# Patient Record
Sex: FEMALE | Race: WHITE | URBAN - METROPOLITAN AREA
[De-identification: names, ages, dates, MRNs, and addresses within clinical notes are randomized per-mention and may not be internally consistent; named-entity substitution may affect disease eponyms.]

---

## 2022-01-07 ENCOUNTER — LAB VISIT (OUTPATIENT)
Dept: PRIMARY CARE CLINIC | Facility: CLINIC | Age: 57
End: 2022-01-07
Payer: COMMERCIAL

## 2022-01-07 DIAGNOSIS — Z20.822 CONTACT WITH AND (SUSPECTED) EXPOSURE TO COVID-19: ICD-10-CM

## 2022-01-07 LAB
CTP QC/QA: YES
SARS-COV-2 AG RESP QL IA.RAPID: POSITIVE

## 2022-01-07 PROCEDURE — 87811 SARS-COV-2 COVID19 W/OPTIC: CPT

## 2024-08-27 ENCOUNTER — HOSPITAL ENCOUNTER (INPATIENT)
Facility: HOSPITAL | Age: 59
LOS: 3 days | Discharge: HOME | DRG: 637 | End: 2024-08-30
Attending: EMERGENCY MEDICINE | Admitting: INTERNAL MEDICINE
Payer: COMMERCIAL

## 2024-08-27 DIAGNOSIS — I26.99 OTHER ACUTE PULMONARY EMBOLISM WITHOUT ACUTE COR PULMONALE: ICD-10-CM

## 2024-08-27 DIAGNOSIS — E83.42 HYPOMAGNESEMIA: ICD-10-CM

## 2024-08-27 DIAGNOSIS — J96.01 ACUTE HYPOXIC RESPIRATORY FAILURE: ICD-10-CM

## 2024-08-27 DIAGNOSIS — D64.9 NORMOCYTIC ANEMIA: ICD-10-CM

## 2024-08-27 DIAGNOSIS — I82.432 ACUTE DEEP VEIN THROMBOSIS (DVT) OF POPLITEAL VEIN OF LEFT LOWER EXTREMITY: ICD-10-CM

## 2024-08-27 DIAGNOSIS — R07.9 CHEST PAIN: ICD-10-CM

## 2024-08-27 DIAGNOSIS — I26.99 ACUTE PULMONARY EMBOLISM, UNSPECIFIED PULMONARY EMBOLISM TYPE, UNSPECIFIED WHETHER ACUTE COR PULMONALE PRESENT: ICD-10-CM

## 2024-08-27 DIAGNOSIS — E11.65 TYPE 2 DIABETES MELLITUS WITH HYPERGLYCEMIA, WITHOUT LONG-TERM CURRENT USE OF INSULIN: ICD-10-CM

## 2024-08-27 DIAGNOSIS — R06.02 SHORTNESS OF BREATH: ICD-10-CM

## 2024-08-27 DIAGNOSIS — E11.10 DIABETIC KETOACIDOSIS WITHOUT COMA ASSOCIATED WITH TYPE 2 DIABETES MELLITUS: Primary | ICD-10-CM

## 2024-08-27 DIAGNOSIS — R79.89 ELEVATED TROPONIN: ICD-10-CM

## 2024-08-27 PROBLEM — E87.29 HIGH ANION GAP METABOLIC ACIDOSIS: Status: ACTIVE | Noted: 2024-08-27

## 2024-08-27 LAB
ALBUMIN SERPL BCP-MCNC: 3.9 G/DL (ref 3.5–5.2)
ALP SERPL-CCNC: 204 U/L (ref 55–135)
ALT SERPL W/O P-5'-P-CCNC: 19 U/L (ref 10–44)
ANION GAP SERPL CALC-SCNC: 11 MMOL/L (ref 8–16)
ANION GAP SERPL CALC-SCNC: 17 MMOL/L (ref 8–16)
ANION GAP SERPL CALC-SCNC: 18 MMOL/L (ref 8–16)
APTT PPP: 28.7 SEC (ref 21–32)
AST SERPL-CCNC: 22 U/L (ref 10–40)
B-OH-BUTYR BLD STRIP-SCNC: 2.5 MMOL/L (ref 0–0.5)
BACTERIA #/AREA URNS HPF: NORMAL /HPF
BASOPHILS # BLD AUTO: 0.05 K/UL (ref 0–0.2)
BASOPHILS NFR BLD: 0.4 % (ref 0–1.9)
BILIRUB SERPL-MCNC: 0.6 MG/DL (ref 0.1–1)
BILIRUB UR QL STRIP: NEGATIVE
BNP SERPL-MCNC: 29 PG/ML (ref 0–99)
BUN SERPL-MCNC: 14 MG/DL (ref 6–20)
BUN SERPL-MCNC: 16 MG/DL (ref 6–20)
BUN SERPL-MCNC: 20 MG/DL (ref 6–20)
CALCIUM SERPL-MCNC: 8.3 MG/DL (ref 8.7–10.5)
CALCIUM SERPL-MCNC: 8.7 MG/DL (ref 8.7–10.5)
CALCIUM SERPL-MCNC: 9.7 MG/DL (ref 8.7–10.5)
CHLORIDE SERPL-SCNC: 100 MMOL/L (ref 95–110)
CHLORIDE SERPL-SCNC: 101 MMOL/L (ref 95–110)
CHLORIDE SERPL-SCNC: 104 MMOL/L (ref 95–110)
CLARITY UR: CLEAR
CO2 SERPL-SCNC: 15 MMOL/L (ref 23–29)
CO2 SERPL-SCNC: 17 MMOL/L (ref 23–29)
CO2 SERPL-SCNC: 19 MMOL/L (ref 23–29)
COLOR UR: COLORLESS
CREAT SERPL-MCNC: 0.8 MG/DL (ref 0.5–1.4)
CREAT SERPL-MCNC: 1 MG/DL (ref 0.5–1.4)
CREAT SERPL-MCNC: 1.3 MG/DL (ref 0.5–1.4)
DIFFERENTIAL METHOD BLD: ABNORMAL
EOSINOPHIL # BLD AUTO: 0.1 K/UL (ref 0–0.5)
EOSINOPHIL NFR BLD: 0.4 % (ref 0–8)
ERYTHROCYTE [DISTWIDTH] IN BLOOD BY AUTOMATED COUNT: 13.3 % (ref 11.5–14.5)
EST. GFR  (NO RACE VARIABLE): 48 ML/MIN/1.73 M^2
EST. GFR  (NO RACE VARIABLE): >60 ML/MIN/1.73 M^2
EST. GFR  (NO RACE VARIABLE): >60 ML/MIN/1.73 M^2
ESTIMATED AVG GLUCOSE: ABNORMAL MG/DL (ref 68–131)
GLUCOSE SERPL-MCNC: 318 MG/DL (ref 70–110)
GLUCOSE SERPL-MCNC: 468 MG/DL (ref 70–110)
GLUCOSE SERPL-MCNC: 586 MG/DL (ref 70–110)
GLUCOSE UR QL STRIP: ABNORMAL
HBA1C MFR BLD: >14 % (ref 4–5.6)
HCT VFR BLD AUTO: 40.7 % (ref 37–48.5)
HGB BLD-MCNC: 14.5 G/DL (ref 12–16)
HGB UR QL STRIP: NEGATIVE
HYALINE CASTS #/AREA URNS LPF: 0 /LPF
IMM GRANULOCYTES # BLD AUTO: 0.08 K/UL (ref 0–0.04)
IMM GRANULOCYTES NFR BLD AUTO: 0.6 % (ref 0–0.5)
INFLUENZA A, MOLECULAR: NEGATIVE
INFLUENZA B, MOLECULAR: NEGATIVE
INR PPP: 1.1 (ref 0.8–1.2)
KETONES UR QL STRIP: ABNORMAL
LACTATE SERPL-SCNC: 2 MMOL/L (ref 0.5–2.2)
LEUKOCYTE ESTERASE UR QL STRIP: NEGATIVE
LYMPHOCYTES # BLD AUTO: 1.3 K/UL (ref 1–4.8)
LYMPHOCYTES NFR BLD: 9.3 % (ref 18–48)
MAGNESIUM SERPL-MCNC: 1.5 MG/DL (ref 1.6–2.6)
MCH RBC QN AUTO: 30.1 PG (ref 27–31)
MCHC RBC AUTO-ENTMCNC: 35.6 G/DL (ref 32–36)
MCV RBC AUTO: 85 FL (ref 82–98)
MICROSCOPIC COMMENT: NORMAL
MONOCYTES # BLD AUTO: 0.6 K/UL (ref 0.3–1)
MONOCYTES NFR BLD: 4.2 % (ref 4–15)
NEUTROPHILS # BLD AUTO: 11.7 K/UL (ref 1.8–7.7)
NEUTROPHILS NFR BLD: 85.1 % (ref 38–73)
NITRITE UR QL STRIP: NEGATIVE
NRBC BLD-RTO: 0 /100 WBC
PCO2 BLDA: 32.5 MMHG (ref 35–45)
PH SMN: 7.37 [PH] (ref 7.35–7.45)
PH UR STRIP: 5 [PH] (ref 5–8)
PHOSPHATE SERPL-MCNC: 3.3 MG/DL (ref 2.7–4.5)
PLATELET # BLD AUTO: 338 K/UL (ref 150–450)
PLATELET BLD QL SMEAR: ABNORMAL
PMV BLD AUTO: 11 FL (ref 9.2–12.9)
PO2 BLDA: 44.3 MMHG (ref 40–60)
POC BASE DEFICIT: -5.5 MMOL/L (ref -2–2)
POC HCO3: 18.8 MMOL/L (ref 24–28)
POC PERFORMED BY: ABNORMAL
POC SATURATED O2: 77.8 % (ref 95–100)
POCT GLUCOSE: 321 MG/DL (ref 70–110)
POCT GLUCOSE: 329 MG/DL (ref 70–110)
POCT GLUCOSE: 335 MG/DL (ref 70–110)
POCT GLUCOSE: 361 MG/DL (ref 70–110)
POCT GLUCOSE: 429 MG/DL (ref 70–110)
POCT GLUCOSE: >500 MG/DL (ref 70–110)
POTASSIUM SERPL-SCNC: 3.7 MMOL/L (ref 3.5–5.1)
POTASSIUM SERPL-SCNC: 4.2 MMOL/L (ref 3.5–5.1)
POTASSIUM SERPL-SCNC: 5.1 MMOL/L (ref 3.5–5.1)
PROT SERPL-MCNC: 7.9 G/DL (ref 6–8.4)
PROT UR QL STRIP: ABNORMAL
PROTHROMBIN TIME: 11.6 SEC (ref 9–12.5)
RBC # BLD AUTO: 4.81 M/UL (ref 4–5.4)
RBC #/AREA URNS HPF: 1 /HPF (ref 0–4)
SARS-COV-2 RDRP RESP QL NAA+PROBE: NEGATIVE
SODIUM SERPL-SCNC: 133 MMOL/L (ref 136–145)
SODIUM SERPL-SCNC: 134 MMOL/L (ref 136–145)
SODIUM SERPL-SCNC: 135 MMOL/L (ref 136–145)
SP GR UR STRIP: >1.03 (ref 1–1.03)
SPECIMEN SOURCE: ABNORMAL
SPECIMEN SOURCE: NORMAL
SQUAMOUS #/AREA URNS HPF: 0 /HPF
TROPONIN I SERPL DL<=0.01 NG/ML-MCNC: 0.09 NG/ML (ref 0–0.03)
TROPONIN I SERPL DL<=0.01 NG/ML-MCNC: 0.1 NG/ML (ref 0–0.03)
TROPONIN I SERPL DL<=0.01 NG/ML-MCNC: 0.1 NG/ML (ref 0–0.03)
URN SPEC COLLECT METH UR: ABNORMAL
UROBILINOGEN UR STRIP-ACNC: NEGATIVE EU/DL
WBC # BLD AUTO: 13.69 K/UL (ref 3.9–12.7)
WBC #/AREA URNS HPF: 1 /HPF (ref 0–5)
YEAST URNS QL MICRO: NORMAL

## 2024-08-27 PROCEDURE — 82010 KETONE BODYS QUAN: CPT

## 2024-08-27 PROCEDURE — 83036 HEMOGLOBIN GLYCOSYLATED A1C: CPT | Performed by: STUDENT IN AN ORGANIZED HEALTH CARE EDUCATION/TRAINING PROGRAM

## 2024-08-27 PROCEDURE — 84100 ASSAY OF PHOSPHORUS: CPT | Performed by: STUDENT IN AN ORGANIZED HEALTH CARE EDUCATION/TRAINING PROGRAM

## 2024-08-27 PROCEDURE — 84484 ASSAY OF TROPONIN QUANT: CPT | Mod: 91 | Performed by: STUDENT IN AN ORGANIZED HEALTH CARE EDUCATION/TRAINING PROGRAM

## 2024-08-27 PROCEDURE — 85730 THROMBOPLASTIN TIME PARTIAL: CPT | Performed by: STUDENT IN AN ORGANIZED HEALTH CARE EDUCATION/TRAINING PROGRAM

## 2024-08-27 PROCEDURE — 96361 HYDRATE IV INFUSION ADD-ON: CPT

## 2024-08-27 PROCEDURE — 63600175 PHARM REV CODE 636 W HCPCS: Performed by: STUDENT IN AN ORGANIZED HEALTH CARE EDUCATION/TRAINING PROGRAM

## 2024-08-27 PROCEDURE — 81000 URINALYSIS NONAUTO W/SCOPE: CPT

## 2024-08-27 PROCEDURE — 25500020 PHARM REV CODE 255: Performed by: EMERGENCY MEDICINE

## 2024-08-27 PROCEDURE — 63600175 PHARM REV CODE 636 W HCPCS: Performed by: EMERGENCY MEDICINE

## 2024-08-27 PROCEDURE — 99900035 HC TECH TIME PER 15 MIN (STAT)

## 2024-08-27 PROCEDURE — 80048 BASIC METABOLIC PNL TOTAL CA: CPT | Mod: 91,XB | Performed by: STUDENT IN AN ORGANIZED HEALTH CARE EDUCATION/TRAINING PROGRAM

## 2024-08-27 PROCEDURE — 83880 ASSAY OF NATRIURETIC PEPTIDE: CPT

## 2024-08-27 PROCEDURE — 87502 INFLUENZA DNA AMP PROBE: CPT

## 2024-08-27 PROCEDURE — 84484 ASSAY OF TROPONIN QUANT: CPT

## 2024-08-27 PROCEDURE — 80053 COMPREHEN METABOLIC PANEL: CPT

## 2024-08-27 PROCEDURE — 82803 BLOOD GASES ANY COMBINATION: CPT

## 2024-08-27 PROCEDURE — U0002 COVID-19 LAB TEST NON-CDC: HCPCS

## 2024-08-27 PROCEDURE — 83735 ASSAY OF MAGNESIUM: CPT | Performed by: STUDENT IN AN ORGANIZED HEALTH CARE EDUCATION/TRAINING PROGRAM

## 2024-08-27 PROCEDURE — 85025 COMPLETE CBC W/AUTO DIFF WBC: CPT

## 2024-08-27 PROCEDURE — 11000001 HC ACUTE MED/SURG PRIVATE ROOM

## 2024-08-27 PROCEDURE — 63600175 PHARM REV CODE 636 W HCPCS

## 2024-08-27 PROCEDURE — 85610 PROTHROMBIN TIME: CPT | Performed by: STUDENT IN AN ORGANIZED HEALTH CARE EDUCATION/TRAINING PROGRAM

## 2024-08-27 PROCEDURE — 99291 CRITICAL CARE FIRST HOUR: CPT

## 2024-08-27 PROCEDURE — 25000003 PHARM REV CODE 250

## 2024-08-27 PROCEDURE — 25000003 PHARM REV CODE 250: Performed by: EMERGENCY MEDICINE

## 2024-08-27 PROCEDURE — 96374 THER/PROPH/DIAG INJ IV PUSH: CPT

## 2024-08-27 PROCEDURE — 83605 ASSAY OF LACTIC ACID: CPT | Performed by: EMERGENCY MEDICINE

## 2024-08-27 RX ORDER — DEXTROSE MONOHYDRATE 100 MG/ML
INJECTION, SOLUTION INTRAVENOUS
Status: DISCONTINUED | OUTPATIENT
Start: 2024-08-27 | End: 2024-08-27

## 2024-08-27 RX ORDER — ALPRAZOLAM 0.5 MG/1
0.5 TABLET ORAL DAILY PRN
COMMUNITY

## 2024-08-27 RX ORDER — IBUPROFEN 200 MG
24 TABLET ORAL
Status: DISCONTINUED | OUTPATIENT
Start: 2024-08-27 | End: 2024-08-30 | Stop reason: HOSPADM

## 2024-08-27 RX ORDER — DEXTROSE MONOHYDRATE, SODIUM CHLORIDE, AND POTASSIUM CHLORIDE 50; 1.49; 4.5 G/1000ML; G/1000ML; G/1000ML
INJECTION, SOLUTION INTRAVENOUS CONTINUOUS PRN
Status: CANCELLED | OUTPATIENT
Start: 2024-08-27

## 2024-08-27 RX ORDER — IBUPROFEN 200 MG
200 TABLET ORAL EVERY 6 HOURS PRN
Status: ON HOLD | COMMUNITY
End: 2024-08-30 | Stop reason: HOSPADM

## 2024-08-27 RX ORDER — IBUPROFEN 200 MG
16 TABLET ORAL
Status: DISCONTINUED | OUTPATIENT
Start: 2024-08-27 | End: 2024-08-30 | Stop reason: HOSPADM

## 2024-08-27 RX ORDER — INSULIN ASPART 100 [IU]/ML
0-10 INJECTION, SOLUTION INTRAVENOUS; SUBCUTANEOUS
Status: DISCONTINUED | OUTPATIENT
Start: 2024-08-27 | End: 2024-08-30 | Stop reason: HOSPADM

## 2024-08-27 RX ORDER — INSULIN GLARGINE 100 [IU]/ML
20 INJECTION, SOLUTION SUBCUTANEOUS NIGHTLY
Status: ON HOLD | COMMUNITY
End: 2024-08-30 | Stop reason: HOSPADM

## 2024-08-27 RX ORDER — GLUCAGON 1 MG
1 KIT INJECTION
Status: DISCONTINUED | OUTPATIENT
Start: 2024-08-27 | End: 2024-08-30 | Stop reason: HOSPADM

## 2024-08-27 RX ORDER — SODIUM CHLORIDE, SODIUM LACTATE, POTASSIUM CHLORIDE, CALCIUM CHLORIDE 600; 310; 30; 20 MG/100ML; MG/100ML; MG/100ML; MG/100ML
INJECTION, SOLUTION INTRAVENOUS CONTINUOUS
Status: DISCONTINUED | OUTPATIENT
Start: 2024-08-27 | End: 2024-08-28

## 2024-08-27 RX ORDER — SODIUM CHLORIDE AND POTASSIUM CHLORIDE 150; 900 MG/100ML; MG/100ML
INJECTION, SOLUTION INTRAVENOUS CONTINUOUS
Status: CANCELLED | OUTPATIENT
Start: 2024-08-27

## 2024-08-27 RX ORDER — SODIUM CHLORIDE 0.9 % (FLUSH) 0.9 %
10 SYRINGE (ML) INJECTION
Status: DISCONTINUED | OUTPATIENT
Start: 2024-08-27 | End: 2024-08-30 | Stop reason: HOSPADM

## 2024-08-27 RX ORDER — SODIUM CHLORIDE 9 MG/ML
1000 INJECTION, SOLUTION INTRAVENOUS CONTINUOUS
Status: DISCONTINUED | OUTPATIENT
Start: 2024-08-27 | End: 2024-08-27

## 2024-08-27 RX ORDER — INSULIN GLARGINE 100 [IU]/ML
10 INJECTION, SOLUTION SUBCUTANEOUS NIGHTLY
Status: DISCONTINUED | OUTPATIENT
Start: 2024-08-27 | End: 2024-08-28

## 2024-08-27 RX ORDER — ACETAMINOPHEN 325 MG/1
650 TABLET ORAL EVERY 6 HOURS PRN
Status: DISCONTINUED | OUTPATIENT
Start: 2024-08-27 | End: 2024-08-30 | Stop reason: HOSPADM

## 2024-08-27 RX ORDER — HEPARIN SODIUM,PORCINE/D5W 25000/250
0-40 INTRAVENOUS SOLUTION INTRAVENOUS CONTINUOUS
Status: DISCONTINUED | OUTPATIENT
Start: 2024-08-27 | End: 2024-08-29

## 2024-08-27 RX ORDER — DEXTROSE MONOHYDRATE AND SODIUM CHLORIDE 5; .45 G/100ML; G/100ML
INJECTION, SOLUTION INTRAVENOUS CONTINUOUS PRN
Status: DISCONTINUED | OUTPATIENT
Start: 2024-08-27 | End: 2024-08-27

## 2024-08-27 RX ORDER — TALC
6 POWDER (GRAM) TOPICAL NIGHTLY PRN
Status: DISCONTINUED | OUTPATIENT
Start: 2024-08-27 | End: 2024-08-30 | Stop reason: HOSPADM

## 2024-08-27 RX ORDER — CETIRIZINE HYDROCHLORIDE 10 MG/1
10 TABLET ORAL DAILY
COMMUNITY

## 2024-08-27 RX ADMIN — SODIUM CHLORIDE, POTASSIUM CHLORIDE, SODIUM LACTATE AND CALCIUM CHLORIDE 1000 ML: 600; 310; 30; 20 INJECTION, SOLUTION INTRAVENOUS at 12:08

## 2024-08-27 RX ADMIN — INSULIN ASPART 4 UNITS: 100 INJECTION, SOLUTION INTRAVENOUS; SUBCUTANEOUS at 08:08

## 2024-08-27 RX ADMIN — SODIUM CHLORIDE, POTASSIUM CHLORIDE, SODIUM LACTATE AND CALCIUM CHLORIDE: 600; 310; 30; 20 INJECTION, SOLUTION INTRAVENOUS at 06:08

## 2024-08-27 RX ADMIN — INSULIN GLARGINE 10 UNITS: 100 INJECTION, SOLUTION SUBCUTANEOUS at 08:08

## 2024-08-27 RX ADMIN — SODIUM CHLORIDE 1000 ML: 9 INJECTION, SOLUTION INTRAVENOUS at 02:08

## 2024-08-27 RX ADMIN — ACETAMINOPHEN 650 MG: 325 TABLET ORAL at 10:08

## 2024-08-27 RX ADMIN — IOHEXOL 100 ML: 350 INJECTION, SOLUTION INTRAVENOUS at 04:08

## 2024-08-27 RX ADMIN — Medication 6 MG: at 10:08

## 2024-08-27 RX ADMIN — HEPARIN SODIUM 18 UNITS/KG/HR: 10000 INJECTION, SOLUTION INTRAVENOUS at 06:08

## 2024-08-27 RX ADMIN — HUMAN INSULIN 9 UNITS: 100 INJECTION, SOLUTION SUBCUTANEOUS at 03:08

## 2024-08-27 NOTE — ED NOTES
Pt ambulatory to the bathroom. Pt walked with a steady gait. Pt denies any needs at this time. Pt hooked back up to monitor. Pt placed on 2L nasal cannula.

## 2024-08-27 NOTE — LETTER
"August 30, 2024             [facility]  [address]  [place] [address]  Phone: [phone]  August 30, 2024     Patient: Davida Plasencia (Teresa)     YOB: 1965        To Whom It May Concern:    Davida Plasencia was admitted to the hospital on 8/27/2024 12:34 PM and discharged on  8/30/22024 .  She may return to work on 8/31/24 .  If you have any questions or concerns, or if I can be of any further assistance, please do not hesitate to contact me.    Sincerely,        Shelli Jordan, DO  Department of Hospital Medicine     "

## 2024-08-27 NOTE — H&P
[facility] History and Physical  [facility]    Admitting Team: RHONDA Attending: Jada Lynch MD   Resident: Shelli Jordan DO Intern: Lew David MD     Date of Admit: 8/27/2024    Chief Complaint and Duration     SOB for 1 day    Subjective      History of Present Illness:  Davida Plasencia is a 59 y.o. female with PMH of pre-diabetes, previously on metformin, who presented to [facility] on 8/27/2024 for acute-onset SOB that began while she was walking to her neighbors house today.    The patient was in their usual state of health which includes independent of all ADLs, until today when she experienced sudden-onset dyspnea with associated light-headedness and palpitations. She denies associated chest pain or nausea. She has also had left calf aching pain for the past day.  No history of recent travel. Her only medication is zyrtec, denying use of hormonal therapy. She is a non-smoker, drinks alcohol very rarely. She states her father experienced multiple strokes. Her mother had breast cancer; patient states she has had genetic workup that was negative.    Review of Systems:  Review of Systems   Constitutional:  Negative for chills and fever.   HENT:  Negative for congestion and sore throat.    Respiratory:  Positive for shortness of breath. Negative for cough, hemoptysis and sputum production.    Cardiovascular:  Positive for palpitations. Negative for chest pain and orthopnea.   Gastrointestinal:  Negative for abdominal pain, constipation, nausea and vomiting.   Genitourinary:  Negative for dysuria, frequency and urgency.   Musculoskeletal:  Positive for myalgias (Left calf).   Skin:  Negative for rash.   Neurological:  Positive for headaches (described as mild). Negative for focal weakness and weakness.     All other systems reviewed and negative    Past Medical History:  Pre-diabetes  COVID in January 2022    Past Surgical History:  Hysterectomy 2020    Allergies:  Review of patient's  allergies indicates:  No Known Allergies    Home Medications:  Prior to Admission medications    Medication Sig Start Date End Date Taking? Authorizing Provider   cetirizine (ZYRTEC) 10 MG tablet Take 10 mg by mouth once daily.   Yes Provider, Historical   ibuprofen (ADVIL,MOTRIN) 200 MG tablet Take 200 mg by mouth every 6 (six) hours as needed for Pain.   Yes Provider, Historical                       Family History:  Dad - diabetes, stroke, blood-clot at age 80, colon cancer  Mom - breast cancer, MI at 72  Sister - brain tumor diagnosed in her 20s,  in her 30s    Social History:  Tobacco:   Tobacco Use: Not on file     EtOH:   Alcohol Use: Not on file     Illicits:   Social History     Substance and Sexual Activity   Drug Use Not on file      Occupation: Data Unavailable.      Health Maintaince :   Primary Care Physician: No, Primary Doctor    Immunizations:   Currently on File within the [facility]:   There is no immunization history on file for this patient.  TDap:   [x]   Influenza:  []  Pneumovax:  []  COVID-19:  [x] x2    Cancer Screening:  PAP:   [] Unsure when last checked  Mammogram:  [] Out-of-date  Colonoscopy:  [x] 3 years ago, told to come back in 10 years     Objective     Physical Examination:    Triage: BP: 131/80  Pulse: (!) 117  Temp: 98.4 °F (36.9 °C)  Resp: 18  Height: 5' (152.4 cm)  Weight: 91.2 kg (201 lb)  BMI (Calculated): 39.3  SpO2: (!) 92 %  Exam: BP (!) 143/84 (BP Location: Right arm, Patient Position: Lying)   Pulse 107   Temp 98.4 °F (36.9 °C)   Resp 19   Ht 5' (1.524 m)   Wt 91.2 kg (201 lb)   SpO2 97%   BMI 39.26 kg/m²   Body mass index is 39.26 kg/m².     Physical Exam  Constitutional:       General: She is not in acute distress.     Appearance: She is not ill-appearing.   HENT:      Head: Normocephalic and atraumatic.      Mouth/Throat:      Mouth: Mucous membranes are moist.      Pharynx: Oropharynx is clear.   Eyes:      General: No visual field deficit.      Extraocular Movements: Extraocular movements intact.      Conjunctiva/sclera: Conjunctivae normal.   Cardiovascular:      Rate and Rhythm: Regular rhythm. Tachycardia present.      Pulses: Normal pulses.      Heart sounds: No murmur heard.     No gallop.   Pulmonary:      Effort: Pulmonary effort is normal. No respiratory distress.      Breath sounds: Normal breath sounds. No wheezing, rhonchi or rales.   Abdominal:      General: There is no distension.      Palpations: Abdomen is soft.      Tenderness: There is no abdominal tenderness. There is no guarding.   Musculoskeletal:         General: No tenderness or signs of injury.      Cervical back: Normal range of motion.      Right lower leg: No edema.      Left lower leg: Edema (Mildly increased diameter of left calf when compared to the right. Non-tender.) present.   Skin:     General: Skin is warm and dry.      Capillary Refill: Capillary refill takes less than 2 seconds.      Findings: No rash.   Neurological:      General: No focal deficit present.      Mental Status: She is alert and oriented to person, place, and time. Mental status is at baseline.      Cranial Nerves: No dysarthria or facial asymmetry.   Psychiatric:         Mood and Affect: Mood normal.         Behavior: Behavior normal.        Laboratory:  Lab Results   Component Value Date    WBC 13.69 (H) 08/27/2024    HGB 14.5 08/27/2024    MCV 85 08/27/2024    MCH 30.1 08/27/2024    MCHC 35.6 08/27/2024    RDW 13.3 08/27/2024     08/27/2024    MPV 11.0 08/27/2024    PLTEST Appears normal 08/27/2024     Lab Results   Component Value Date    CREATININE 1.3 08/27/2024    BUN 20 08/27/2024     (L) 08/27/2024    K 5.1 08/27/2024     08/27/2024    CO2 15 (L) 08/27/2024     Glucose - 586  Anion gap - 17  BHB - 2.5    All laboratory data reviewed    Microbiology Data:  Microbiology Results (last 7 days)       Procedure Component Value Units Date/Time    Influenza A & B by Molecular  [[id number]] Collected: 08/27/24 1242    Order Status: Completed Specimen: Nasopharyngeal Swab Updated: 08/27/24 1314     Influenza A, Molecular Negative     Influenza B, Molecular Negative     Flu A & B Source Nasal swab          UA:   Latest Reference Range & Units 08/27/24 12:42   Specimen UA  Urine, Clean Catch   Color, UA Yellow, Straw, Angela  Colorless !   Appearance, UA Clear  Clear   Spec Grav UA 1.005 - 1.030  >1.030 !   pH, UA 5.0 - 8.0  5.0   Protein, UA Negative  1+ !   Glucose, UA Negative  4+ !   Ketones, UA Negative  2+ !   Blood, UA Negative  Negative   NITRITE UA Negative  Negative   UROBILINOGEN UA <2.0 EU/dL Negative   Bilirubin (UA) Negative  Negative   Leukocyte Esterase, UA Negative  Negative   RBC, UA 0 - 4 /hpf 1   WBC, UA 0 - 5 /hpf 1   Bacteria, UA None-Occ /hpf None   Squam Epithel, UA /hpf 0   Hyaline Casts, UA 0-1/lpf /lpf 0   Yeast, UA None  None   !: Data is abnormal    EKG:  No results found for this or any previous visit.     I have personally reviewed the above EKG    Radiology:  X-Ray Chest 1 View   Final Result      No acute process.         Electronically signed by: Robert Carreno MD   Date:    08/27/2024   Time:    14:25           I have personally reviewed the above imaging    Assessment/Plan     Davida Plasencia is a 59 y.o. female with PMH of prediabetes who presented to [facility] on 8/27/2024 for acute-onset SOB.  Patient is admitted to [facility] for DKA.     #Acute hypoxic respiratory failure  #Submassive bilateral pulmonary embolisms  Pt presented with story of sudden-onset SOB while walking to her neighbors house. She has associated palpitations and lightheadedness. New left calf pain for 1 day. Denies CP. Afebrile. Non-smoker, no significant PMH, no history of recent travel.  - In ED - tachycardic to 117, hypoxic to 86%, tachypneic to 27  - VBG - 7.37/32.5/44.3/18.8  - CXR with no acute process, no focal consolidations  - Afebrile, normotensive, lactate WNL in ED  - Wells  score of 4.5  - Bedside POCUS with poor visualization but possible septal flattening  - BNP 29  - CTPE - multiple bilateral pulmonary thromboembolisms with mild right ventricular strain (LV:RV Ratio 1:1)  - DVT US - Nonocclusive thrombus is suggested in the left popliteal vein. Lesser saphenous vein appears thrombosed as well.   Plan:  - Started on Heparin drip  - Consulted cardiology for consideration of catheter-guided therapy  - Cards recommends continued heparin drip - no intervention at this time  - Will continue on heparin drip pending clinical evaluation with plans of transitioning to apixaban    #Diabetic Ketoacidosis  #HAGMA  #T2DM, uncontrolled  She has a remote history of pre-diabetes, previously on Metformin. She had no recent A1C on file and has never used insulin.  - Found to have glucose of 586, BHB 2.5, urine ketones, and an anion gap of 17  - VBG with pH 7.37  - She was given 9 units subcutaneously in ED with 2L NS  - On repeat labs, gap closed at 11  - A1C on admission >14  Plan:  - Admitting to floor with gap closed and resolution following initial IVF/insulin  - q4 BMP to monitor  - Starting on 10u Lantus nightly with SSI  - She will need outpatient follow-up for T2DM management    #NSTEMI, type 2  Troponin elevation of 0.105 with no reported chest pain. EKG without evidence of ischemic changes. Found to have submassive PE.  - Repeat troponin plateau at 0.105    Diet: Diabetic  Code: Full  Dispo: Pending resolution of DKA  Estimated LOS: 24-72 hrs    Lew David MD  [facility] PGY-I    Patient seen and discussed with Jada Lynch MD. Attestation may differ from plan, please appreciate.    VTE Risk Mitigation (From admission, onward)      None

## 2024-08-27 NOTE — ED NOTES
Pt seen in room with a complaint of panic, anxiety, and heart racing. Pt AAOx3 and is a good historian, states that she has not been keeping up with herself and has unmanaged diabetes.

## 2024-08-27 NOTE — Clinical Note
Diagnosis: Diabetic ketoacidosis without coma associated with type 2 diabetes mellitus [[id number]]   Future Attending Provider: SRINIVASAN LUO [[id number]]   Reason for IP Medical Treatment  (Clinical interventions that can only be accomplished in the IP setting? ) :: Insulin drip, DKA treatment   Plans for Post-Acute care--if anticipated (pick the single best option):: A. No post acute care anticipated at this time

## 2024-08-27 NOTE — ED PROVIDER NOTES
Emergency Department Encounter  Provider Note  Encounter Date: 8/27/2024    Patient Name: Davida Plasencia  MRN: [id number]    History of Present Illness   HPI  History of Present Illness:    Chief Complaint:   Chief Complaint   Patient presents with    Shortness of Breath     Patient presents to the ED complaining of generalized weakness, shortness of breath when walking, and congestion x3-4 days. Patient sent from MD office for evaluation. CBG over 500 in triage.     59-year-old female presenting with shortness of breath that started today.  She says that she was feeling under the weather for the past couple of days, but this morning when she walked across the street she was very short of breath.  Came in for evaluation, no fevers or chills, nausea or vomiting.  Denies any chest pain.  Denies any recent long travel, leg pain, exogenous hormone use.  Just takes Zyrtec.  Not on any medication for diabetes.    The following PMH/PSH/SocHx/FamHx has been reviewed by myself:  No past medical history on file.  No past surgical history on file.     No family history on file.  Allergies reviewed:  Review of patient's allergies indicates:  No Known Allergies  Medications reviewed:  Medication List with Changes/Refills   Current Medications    ALPRAZOLAM (XANAX) 0.5 MG TABLET    Take 0.5 mg by mouth daily as needed.    CETIRIZINE (ZYRTEC) 10 MG TABLET    Take 10 mg by mouth once daily.    IBUPROFEN (ADVIL,MOTRIN) 200 MG TABLET    Take 200 mg by mouth every 6 (six) hours as needed for Pain.    INSULIN GLARGINE U-100, LANTUS, (BASAGLAR KWIKPEN U-100 INSULIN) 100 UNIT/ML (3 ML) INPN PEN    Inject 20 Units into the skin every evening.       Physical Exam     Initial Vitals [08/27/24 1222]   BP Pulse Resp Temp SpO2   131/80 (!) 117 18 98.4 °F (36.9 °C) (!) 92 %      MAP       --           Triage vital signs reviewed.    Constitutional: Well-nourished, well-developed.  Tachypneic, slightly anxious appearing.  HENT: Normocephalic,  atraumatic. Moist mucous membranes.  Eyes: No conjunctival injection.  Resp:  Tachypneic.  Clear lungs bilaterally.  Cardio:  Tachycardic rate and rhythm.  GI: Abdomen non-distended.   MSK: Extremities without any deformities noted. No lower extremity edema.  Skin: Warm and dry. No rashes or lesions noted.  Neuro: Awake and alert. Moves all extremities.    ED Course   Procedures    Medical Decision Making    History Acquisition     The history is provided by the patient.     Review of prior external/non ED notes:  Previous visits to outside hospital clinic, unable to see visit notes.  Patient had diabetes as a diagnosis, but currently not taking medications.    Differential Diagnoses   Based on available information and initial assessment, the working Differential Diagnosis includes, but is not limited to:  PE, MI/ACS, pneumothorax, pericardial effusion/tamonade, pneumonia, lung abscess, pericarditis/myocarditis, pleural effusion, lung mass, CHF exacerbation, asthma exacerbation, COPD exacerbation, aspirated/ingested foreign body, airway obstruction, CO poisoning, anemia, metabolic derangement, allergy/atopy, influenza, viral URI, viral syndrome.    EKG   EKG ordered and independently reviewed by me:   Sinus tachycardia, rate 141, no STEMI    Labs   Lab tests ordered and independently reviewed by me:    Labs Reviewed   CBC W/ AUTO DIFFERENTIAL - Abnormal       Result Value    WBC 13.69 (*)     RBC 4.81      Hemoglobin 14.5      Hematocrit 40.7      MCV 85      MCH 30.1      MCHC 35.6      RDW 13.3      Platelets 338      MPV 11.0      Immature Granulocytes 0.6 (*)     Gran # (ANC) 11.7 (*)     Immature Grans (Abs) 0.08 (*)     Lymph # 1.3      Mono # 0.6      Eos # 0.1      Baso # 0.05      nRBC 0      Gran % 85.1 (*)     Lymph % 9.3 (*)     Mono % 4.2      Eosinophil % 0.4      Basophil % 0.4      Platelet Estimate Appears normal      Differential Method Automated     COMPREHENSIVE METABOLIC PANEL - Abnormal     Sodium 133 (*)     Potassium 5.1      Chloride 101      CO2 15 (*)     Glucose 586 (*)     BUN 20      Creatinine 1.3      Calcium 9.7      Total Protein 7.9      Albumin 3.9      Total Bilirubin 0.6      Alkaline Phosphatase 204 (*)     AST 22      ALT 19      eGFR 48 (*)     Anion Gap 17 (*)     Narrative:     Glucose critical result(s) called and verbal readback obtained from   Mikal Garcia RN. by VD1 08/27/2024 14:15   TROPONIN I - Abnormal    Troponin I 0.105 (*)    BETA - HYDROXYBUTYRATE, SERUM - Abnormal    Beta-Hydroxybutyrate 2.5 (*)    URINALYSIS, REFLEX TO URINE CULTURE - Abnormal    Specimen UA Urine, Clean Catch      Color, UA Colorless (*)     Appearance, UA Clear      pH, UA 5.0      Specific Gravity, UA >1.030 (*)     Protein, UA 1+ (*)     Glucose, UA 4+ (*)     Ketones, UA 2+ (*)     Bilirubin (UA) Negative      Occult Blood UA Negative      Nitrite, UA Negative      Urobilinogen, UA Negative      Leukocytes, UA Negative      Narrative:     Specimen Source->Urine   POCT GLUCOSE - Abnormal    POCT Glucose >500 (*)    POCT GLUCOSE - Abnormal    POCT Glucose 429 (*)    INFLUENZA A & B BY MOLECULAR    Influenza A, Molecular Negative      Influenza B, Molecular Negative      Flu A & B Source Nasal swab     B-TYPE NATRIURETIC PEPTIDE    BNP 29     SARS-COV-2 RNA AMPLIFICATION, QUAL    SARS-CoV-2 RNA, Amplification, Qual Negative     LACTIC ACID, PLASMA    Lactate (Lactic Acid) 2.0     URINALYSIS MICROSCOPIC    RBC, UA 1      WBC, UA 1      Bacteria None      Yeast, UA None      Squam Epithel, UA 0      Hyaline Casts, UA 0      Microscopic Comment SEE COMMENT      Narrative:     Specimen Source->Urine   POCT GLUCOSE MONITORING CONTINUOUS       Imaging   Imaging ordered and independently reviewed by me:   Imaging Results              X-Ray Chest 1 View (Final result)  Result time 08/27/24 14:25:59      Final result by Robert Carreno MD (08/27/24 14:25:59)                   Impression:      No acute  process.      Electronically signed by: Robert Carreno MD  Date:    08/27/2024  Time:    14:25               Narrative:    EXAMINATION:  XR CHEST 1 VIEW    CLINICAL HISTORY:  Shortness of breath    TECHNIQUE:  Single frontal view of the chest was performed.    COMPARISON:  None    FINDINGS:  Monitoring EKG leads are present.  The trachea is unremarkable.  The cardiomediastinal silhouette is within normal limits.  The hilar structures are unremarkable.  There is no evidence of free air beneath the hemidiaphragms.  There are no pleural effusions.  There is no evidence of a pneumothorax.  There is no evidence of pneumomediastinum.  No airspace opacity is present.  There are degenerative changes in the osseous structures.                                         Additional Consideration   Davida Plasencia  presents to the Emergency Department today with SOB.  On exam, she was very tachypneic with clear lungs, tachycardic.  Oxygen saturation dropped to 86% on room air, currently on 2 L nasal cannula.  No PE risk factors, will check labs, give fluids, reassess.  Patient will need admission.    Additional testing considered but not indicated during the course of this workup: further imaging and labwork, not indicated  Co-morbidities/chronic illness/exacerbation of chronic illness considered which impacted clinical decision making:  Diabetes  Procedures done in the ED or plan for the OR: No  Social determinants of care considered during development of treatment plan include: Decreased medical literacy  Discussion of management or test interpretation with external provider: Yes, describe:  [facility], Dr. David  DNR discussion: No    The patient's list of active medications and allergies as documented per RN staff has been reviewed.  Medications given in the ED and/or prescribed:   Medications   sodium chloride 0.9% bolus 1,000 mL 1,000 mL (1,000 mLs Intravenous New Bag 8/27/24 1450)   lactated ringers bolus 1,000 mL  (0 mLs Intravenous Stopped 8/27/24 1358)   insulin regular injection 9 Units 0.09 mL (9 Units Intravenous Given 8/27/24 1500)             ED Course as of 08/27/24 1541   Tue Aug 27, 2024   1526 CBC auto differential(!)  Leukocytosis [CS]   1526 Comprehensive metabolic panel(!!)  Hyperglycemia, elevated anion gap [CS]   1526 Lactic Acid Level: 2.0 [CS]   1526 POCT Venous Blood Gas(!!)  Slight acidosis, decreased bicarb [CS]   1526 Troponin I(!): 0.105  Elevated, patient without chest pain, no STEMI [CS]   1527 Beta-Hydroxybutyrate(!): 2.5  Elevated [CS]   1527 BNP: 29 [CS]   1527 SARS-CoV-2 RNA, Amplification, Qual: Negative [CS]   1527 Influenza A, Molecular: Negative [CS]   1527 Influenza B, Molecular: Negative [CS]   1527 Urinalysis, Reflex to Urine Culture Urine, Clean Catch(!)  Glucose, ketones, no infection [CS]   1527 X-Ray Chest 1 View  Independently interpreted by me; unremarkable or consistent with the patient's baseline   [CS]   1541 Concern for mild DKA.  Insulin and fluids ordered.  After 1 L fluid, patient repeat blood sugar is 429. [CS]      ED Course User Index  [CS] Jada Lynch MD       Explanation of disposition: Admit    Critical Care:    I have personally provided 30 minutes of critical care time exclusive of time spent on separately billable procedures. Time includes review of laboratory data, radiology results, discussion with consultants, and monitoring for potential decompensation. Interventions were performed as documented above.      Clinical Impression:     1. Diabetic ketoacidosis without coma associated with type 2 diabetes mellitus    2. Chest pain    3. Shortness of breath       ED Disposition Condition    Admit Stable               Jada Lynch MD  08/27/24 1541

## 2024-08-27 NOTE — PHARMACY MED REC
"[facility]           Pharmacy  Admission Medication History     The home medication history was taken by Michelle Cooley.      Medication history obtained from Medications listed below were obtained from: Patient/family    Based on information gathered for medication list, you may go to "Admission" then "Reconcile Home Medications" tabs to review and/or act upon those items.     The home medication list has been updated by the Pharmacy department.   Please read ALL comments highlighted in yellow.   Please address this information as you see fit.    Feel free to contact us if you have any questions or require assistance.        No current facility-administered medications on file prior to encounter.     Current Outpatient Medications on File Prior to Encounter   Medication Sig Dispense Refill    cetirizine (ZYRTEC) 10 MG tablet Take 10 mg by mouth once daily.      ibuprofen (ADVIL,MOTRIN) 200 MG tablet Take 200 mg by mouth every 6 (six) hours as needed for Pain.         Please address this information as you see fit.  Feel free to contact us if you have any questions or require assistance.    Michelle Cooley  [phone]                  .          "

## 2024-08-28 PROBLEM — I82.432 ACUTE DEEP VEIN THROMBOSIS (DVT) OF POPLITEAL VEIN OF LEFT LOWER EXTREMITY: Status: ACTIVE | Noted: 2024-08-28

## 2024-08-28 PROBLEM — I26.99 OTHER PULMONARY EMBOLISM WITHOUT ACUTE COR PULMONALE: Status: ACTIVE | Noted: 2024-08-28

## 2024-08-28 PROBLEM — D64.9 NORMOCYTIC ANEMIA: Status: ACTIVE | Noted: 2024-08-28

## 2024-08-28 PROBLEM — E11.65 TYPE 2 DIABETES MELLITUS WITH HYPERGLYCEMIA, WITHOUT LONG-TERM CURRENT USE OF INSULIN: Status: ACTIVE | Noted: 2024-08-28

## 2024-08-28 PROBLEM — E83.42 HYPOMAGNESEMIA: Status: ACTIVE | Noted: 2024-08-28

## 2024-08-28 PROBLEM — R79.89 ELEVATED TROPONIN: Status: ACTIVE | Noted: 2024-08-28

## 2024-08-28 LAB
ALBUMIN SERPL BCP-MCNC: 2.9 G/DL (ref 3.5–5.2)
ALP SERPL-CCNC: 116 U/L (ref 55–135)
ALT SERPL W/O P-5'-P-CCNC: 17 U/L (ref 10–44)
ANION GAP SERPL CALC-SCNC: 10 MMOL/L (ref 8–16)
ANION GAP SERPL CALC-SCNC: 11 MMOL/L (ref 8–16)
ANION GAP SERPL CALC-SCNC: 11 MMOL/L (ref 8–16)
APICAL FOUR CHAMBER EJECTION FRACTION: 74 %
APICAL TWO CHAMBER EJECTION FRACTION: 60 %
APTT PPP: 38.8 SEC (ref 21–32)
APTT PPP: 40.3 SEC (ref 21–32)
APTT PPP: 43.3 SEC (ref 21–32)
ASCENDING AORTA: 2.39 CM
AST SERPL-CCNC: 17 U/L (ref 10–40)
AV INDEX (PROSTH): 0.95
AV MEAN GRADIENT: 5 MMHG
AV PEAK GRADIENT: 7 MMHG
AV VALVE AREA BY VELOCITY RATIO: 2.64 CM²
AV VALVE AREA: 2.83 CM²
AV VELOCITY RATIO: 0.89
BASOPHILS # BLD AUTO: 0.04 K/UL (ref 0–0.2)
BASOPHILS NFR BLD: 0.4 % (ref 0–1.9)
BILIRUB SERPL-MCNC: 0.4 MG/DL (ref 0.1–1)
BSA FOR ECHO PROCEDURE: 1.97 M2
BUN SERPL-MCNC: 14 MG/DL (ref 6–20)
BUN SERPL-MCNC: 15 MG/DL (ref 6–20)
BUN SERPL-MCNC: 17 MG/DL (ref 6–20)
CALCIUM SERPL-MCNC: 8.4 MG/DL (ref 8.7–10.5)
CALCIUM SERPL-MCNC: 8.5 MG/DL (ref 8.7–10.5)
CALCIUM SERPL-MCNC: 8.5 MG/DL (ref 8.7–10.5)
CHLORIDE SERPL-SCNC: 103 MMOL/L (ref 95–110)
CHLORIDE SERPL-SCNC: 104 MMOL/L (ref 95–110)
CHLORIDE SERPL-SCNC: 105 MMOL/L (ref 95–110)
CHLORIDE SERPL-SCNC: 107 MMOL/L (ref 95–110)
CHLORIDE SERPL-SCNC: 107 MMOL/L (ref 95–110)
CO2 SERPL-SCNC: 18 MMOL/L (ref 23–29)
CO2 SERPL-SCNC: 19 MMOL/L (ref 23–29)
CO2 SERPL-SCNC: 19 MMOL/L (ref 23–29)
CO2 SERPL-SCNC: 20 MMOL/L (ref 23–29)
CO2 SERPL-SCNC: 20 MMOL/L (ref 23–29)
CREAT SERPL-MCNC: 0.8 MG/DL (ref 0.5–1.4)
CV ECHO LV RWT: 0.49 CM
DIFFERENTIAL METHOD BLD: ABNORMAL
DOP CALC AO PEAK VEL: 1.31 M/S
DOP CALC AO VTI: 19.4 CM
DOP CALC LVOT AREA: 3 CM2
DOP CALC LVOT DIAMETER: 1.95 CM
DOP CALC LVOT PEAK VEL: 1.16 M/S
DOP CALC LVOT STROKE VOLUME: 54.92 CM3
DOP CALC MV VTI: 19.5 CM
DOP CALCLVOT PEAK VEL VTI: 18.4 CM
E WAVE DECELERATION TIME: 124.38 MSEC
E/A RATIO: 0.7
E/E' RATIO: 8.53 M/S
ECHO LV POSTERIOR WALL: 0.91 CM (ref 0.6–1.1)
EOSINOPHIL # BLD AUTO: 0.2 K/UL (ref 0–0.5)
EOSINOPHIL NFR BLD: 2.1 % (ref 0–8)
ERYTHROCYTE [DISTWIDTH] IN BLOOD BY AUTOMATED COUNT: 12.1 % (ref 11.5–14.5)
EST. GFR  (NO RACE VARIABLE): >60 ML/MIN/1.73 M^2
FRACTIONAL SHORTENING: 30 % (ref 28–44)
GLUCOSE SERPL-MCNC: 270 MG/DL (ref 70–110)
GLUCOSE SERPL-MCNC: 289 MG/DL (ref 70–110)
GLUCOSE SERPL-MCNC: 289 MG/DL (ref 70–110)
GLUCOSE SERPL-MCNC: 328 MG/DL (ref 70–110)
GLUCOSE SERPL-MCNC: 330 MG/DL (ref 70–110)
HCT VFR BLD AUTO: 33.1 % (ref 37–48.5)
HGB BLD-MCNC: 11.5 G/DL (ref 12–16)
IMM GRANULOCYTES # BLD AUTO: 0.04 K/UL (ref 0–0.04)
IMM GRANULOCYTES NFR BLD AUTO: 0.4 % (ref 0–0.5)
INTERVENTRICULAR SEPTUM: 0.88 CM (ref 0.6–1.1)
IVC DIAMETER: 1.91 CM
LA MAJOR: 4.71 CM
LA MINOR: 4.8 CM
LA WIDTH: 2.8 CM
LEFT ATRIUM AREA SYSTOLIC (APICAL 2 CHAMBER): 13.35 CM2
LEFT ATRIUM AREA SYSTOLIC (APICAL 4 CHAMBER): 14.74 CM2
LEFT ATRIUM SIZE: 3.12 CM
LEFT ATRIUM VOLUME INDEX MOD: 17.7 ML/M2
LEFT ATRIUM VOLUME INDEX: 18.9 ML/M2
LEFT ATRIUM VOLUME MOD: 33.16 CM3
LEFT ATRIUM VOLUME: 35.31 CM3
LEFT INTERNAL DIMENSION IN SYSTOLE: 2.6 CM (ref 2.1–4)
LEFT VENTRICLE DIASTOLIC VOLUME INDEX: 31.64 ML/M2
LEFT VENTRICLE DIASTOLIC VOLUME: 59.16 ML
LEFT VENTRICLE END DIASTOLIC VOLUME APICAL 2 CHAMBER: 71.31 ML
LEFT VENTRICLE END DIASTOLIC VOLUME APICAL 4 CHAMBER: 65.49 ML
LEFT VENTRICLE END SYSTOLIC VOLUME APICAL 2 CHAMBER: 30.82 ML
LEFT VENTRICLE END SYSTOLIC VOLUME APICAL 4 CHAMBER: 34.88 ML
LEFT VENTRICLE MASS INDEX: 52 G/M2
LEFT VENTRICLE SYSTOLIC VOLUME INDEX: 13.1 ML/M2
LEFT VENTRICLE SYSTOLIC VOLUME: 24.55 ML
LEFT VENTRICULAR INTERNAL DIMENSION IN DIASTOLE: 3.73 CM (ref 3.5–6)
LEFT VENTRICULAR MASS: 97.36 G
LV LATERAL E/E' RATIO: 8 M/S
LV SEPTAL E/E' RATIO: 9.14 M/S
LVED V (TEICH): 59.16 ML
LVES V (TEICH): 24.55 ML
LVOT MG: 3.24 MMHG
LVOT MV: 0.87 CM/S
LYMPHOCYTES # BLD AUTO: 3.2 K/UL (ref 1–4.8)
LYMPHOCYTES NFR BLD: 33.3 % (ref 18–48)
MAGNESIUM SERPL-MCNC: 1.4 MG/DL (ref 1.6–2.6)
MCH RBC QN AUTO: 29.8 PG (ref 27–31)
MCHC RBC AUTO-ENTMCNC: 34.7 G/DL (ref 32–36)
MCV RBC AUTO: 86 FL (ref 82–98)
MONOCYTES # BLD AUTO: 0.7 K/UL (ref 0.3–1)
MONOCYTES NFR BLD: 6.8 % (ref 4–15)
MV PEAK A VEL: 0.92 M/S
MV PEAK E VEL: 0.64 M/S
MV PEAK GRADIENT: 4 MMHG
MV STENOSIS PRESSURE HALF TIME: 36.07 MS
MV VALVE AREA BY CONTINUITY EQUATION: 2.82 CM2
MV VALVE AREA P 1/2 METHOD: 6.1 CM2
NEUTROPHILS # BLD AUTO: 5.5 K/UL (ref 1.8–7.7)
NEUTROPHILS NFR BLD: 57 % (ref 38–73)
NRBC BLD-RTO: 0 /100 WBC
OHS LV EJECTION FRACTION SIMPSONS BIPLANE MOD: 66 %
PHOSPHATE SERPL-MCNC: 3 MG/DL (ref 2.7–4.5)
PISA TR MAX VEL: 2.55 M/S
PLATELET # BLD AUTO: 178 K/UL (ref 150–450)
PMV BLD AUTO: 9.5 FL (ref 9.2–12.9)
POCT GLUCOSE: 208 MG/DL (ref 70–110)
POCT GLUCOSE: 262 MG/DL (ref 70–110)
POCT GLUCOSE: 285 MG/DL (ref 70–110)
POCT GLUCOSE: 322 MG/DL (ref 70–110)
POTASSIUM SERPL-SCNC: 3.9 MMOL/L (ref 3.5–5.1)
POTASSIUM SERPL-SCNC: 4 MMOL/L (ref 3.5–5.1)
POTASSIUM SERPL-SCNC: 4 MMOL/L (ref 3.5–5.1)
POTASSIUM SERPL-SCNC: 4.3 MMOL/L (ref 3.5–5.1)
POTASSIUM SERPL-SCNC: 4.5 MMOL/L (ref 3.5–5.1)
PROT SERPL-MCNC: 5.7 G/DL (ref 6–8.4)
PULM VEIN S/D RATIO: 1.54
PV PEAK D VEL: 0.28 M/S
PV PEAK GRADIENT: 2 MMHG
PV PEAK S VEL: 0.43 M/S
PV PEAK VELOCITY: 0.78 M/S
RA MAJOR: 4.22 CM
RA PRESSURE ESTIMATED: 3 MMHG
RA WIDTH: 2.89 CM
RBC # BLD AUTO: 3.86 M/UL (ref 4–5.4)
RIGHT VENTRICLE DIASTOLIC BASEL DIMENSION: 3.9 CM
RV TB RVSP: 6 MMHG
RV TISSUE DOPPLER FREE WALL SYSTOLIC VELOCITY 1 (APICAL 4 CHAMBER VIEW): 8.1 CM/S
SINUS: 2.96 CM
SODIUM SERPL-SCNC: 133 MMOL/L (ref 136–145)
SODIUM SERPL-SCNC: 133 MMOL/L (ref 136–145)
SODIUM SERPL-SCNC: 134 MMOL/L (ref 136–145)
SODIUM SERPL-SCNC: 137 MMOL/L (ref 136–145)
SODIUM SERPL-SCNC: 137 MMOL/L (ref 136–145)
STJ: 2.59 CM
TDI LATERAL: 0.08 M/S
TDI SEPTAL: 0.07 M/S
TDI: 0.08 M/S
TR MAX PG: 26 MMHG
TRICUSPID ANNULAR PLANE SYSTOLIC EXCURSION: 1.87 CM
TV REST PULMONARY ARTERY PRESSURE: 29 MMHG
WBC # BLD AUTO: 9.72 K/UL (ref 3.9–12.7)
Z-SCORE OF LEFT VENTRICULAR DIMENSION IN END DIASTOLE: -3.35
Z-SCORE OF LEFT VENTRICULAR DIMENSION IN END SYSTOLE: -1.68

## 2024-08-28 PROCEDURE — 84100 ASSAY OF PHOSPHORUS: CPT | Performed by: STUDENT IN AN ORGANIZED HEALTH CARE EDUCATION/TRAINING PROGRAM

## 2024-08-28 PROCEDURE — 25000003 PHARM REV CODE 250

## 2024-08-28 PROCEDURE — 25500020 PHARM REV CODE 255: Performed by: STUDENT IN AN ORGANIZED HEALTH CARE EDUCATION/TRAINING PROGRAM

## 2024-08-28 PROCEDURE — 36415 COLL VENOUS BLD VENIPUNCTURE: CPT | Performed by: INTERNAL MEDICINE

## 2024-08-28 PROCEDURE — 36415 COLL VENOUS BLD VENIPUNCTURE: CPT | Mod: XB

## 2024-08-28 PROCEDURE — 63600175 PHARM REV CODE 636 W HCPCS

## 2024-08-28 PROCEDURE — 63600175 PHARM REV CODE 636 W HCPCS: Performed by: STUDENT IN AN ORGANIZED HEALTH CARE EDUCATION/TRAINING PROGRAM

## 2024-08-28 PROCEDURE — 11000001 HC ACUTE MED/SURG PRIVATE ROOM

## 2024-08-28 PROCEDURE — 80053 COMPREHEN METABOLIC PANEL: CPT | Performed by: STUDENT IN AN ORGANIZED HEALTH CARE EDUCATION/TRAINING PROGRAM

## 2024-08-28 PROCEDURE — 85025 COMPLETE CBC W/AUTO DIFF WBC: CPT | Performed by: STUDENT IN AN ORGANIZED HEALTH CARE EDUCATION/TRAINING PROGRAM

## 2024-08-28 PROCEDURE — 99223 1ST HOSP IP/OBS HIGH 75: CPT | Mod: 25,,, | Performed by: INTERNAL MEDICINE

## 2024-08-28 PROCEDURE — 85730 THROMBOPLASTIN TIME PARTIAL: CPT | Performed by: INTERNAL MEDICINE

## 2024-08-28 PROCEDURE — 83735 ASSAY OF MAGNESIUM: CPT | Performed by: STUDENT IN AN ORGANIZED HEALTH CARE EDUCATION/TRAINING PROGRAM

## 2024-08-28 PROCEDURE — 85730 THROMBOPLASTIN TIME PARTIAL: CPT | Mod: 91 | Performed by: INTERNAL MEDICINE

## 2024-08-28 PROCEDURE — 80048 BASIC METABOLIC PNL TOTAL CA: CPT | Mod: 91,XB

## 2024-08-28 RX ORDER — POTASSIUM CHLORIDE 7.45 MG/ML
10 INJECTION INTRAVENOUS
Status: COMPLETED | OUTPATIENT
Start: 2024-08-28 | End: 2024-08-28

## 2024-08-28 RX ORDER — MAGNESIUM SULFATE HEPTAHYDRATE 40 MG/ML
2 INJECTION, SOLUTION INTRAVENOUS ONCE
Status: COMPLETED | OUTPATIENT
Start: 2024-08-28 | End: 2024-08-28

## 2024-08-28 RX ORDER — INSULIN ASPART 100 [IU]/ML
5 INJECTION, SOLUTION INTRAVENOUS; SUBCUTANEOUS
Status: DISCONTINUED | OUTPATIENT
Start: 2024-08-28 | End: 2024-08-29

## 2024-08-28 RX ORDER — INSULIN GLARGINE 100 [IU]/ML
15 INJECTION, SOLUTION SUBCUTANEOUS NIGHTLY
Status: DISCONTINUED | OUTPATIENT
Start: 2024-08-28 | End: 2024-08-29

## 2024-08-28 RX ADMIN — MAGNESIUM SULFATE HEPTAHYDRATE 2 G: 40 INJECTION, SOLUTION INTRAVENOUS at 09:08

## 2024-08-28 RX ADMIN — HUMAN ALBUMIN MICROSPHERES AND PERFLUTREN 0.11 MG: 10; .22 INJECTION, SOLUTION INTRAVENOUS at 08:08

## 2024-08-28 RX ADMIN — INSULIN ASPART 2 UNITS: 100 INJECTION, SOLUTION INTRAVENOUS; SUBCUTANEOUS at 08:08

## 2024-08-28 RX ADMIN — INSULIN ASPART 5 UNITS: 100 INJECTION, SOLUTION INTRAVENOUS; SUBCUTANEOUS at 11:08

## 2024-08-28 RX ADMIN — INSULIN ASPART 6 UNITS: 100 INJECTION, SOLUTION INTRAVENOUS; SUBCUTANEOUS at 04:08

## 2024-08-28 RX ADMIN — INSULIN GLARGINE 15 UNITS: 100 INJECTION, SOLUTION SUBCUTANEOUS at 08:08

## 2024-08-28 RX ADMIN — INSULIN ASPART 5 UNITS: 100 INJECTION, SOLUTION INTRAVENOUS; SUBCUTANEOUS at 04:08

## 2024-08-28 RX ADMIN — SODIUM CHLORIDE, POTASSIUM CHLORIDE, SODIUM LACTATE AND CALCIUM CHLORIDE: 600; 310; 30; 20 INJECTION, SOLUTION INTRAVENOUS at 02:08

## 2024-08-28 RX ADMIN — INSULIN ASPART 6 UNITS: 100 INJECTION, SOLUTION INTRAVENOUS; SUBCUTANEOUS at 06:08

## 2024-08-28 RX ADMIN — Medication 6 MG: at 08:08

## 2024-08-28 RX ADMIN — HEPARIN SODIUM 18.02 UNITS/KG/HR: 10000 INJECTION, SOLUTION INTRAVENOUS at 02:08

## 2024-08-28 RX ADMIN — POTASSIUM CHLORIDE 10 MEQ: 7.46 INJECTION, SOLUTION INTRAVENOUS at 01:08

## 2024-08-28 NOTE — PLAN OF CARE
Problem: Adult Inpatient Plan of Care  Goal: Plan of Care Review  Outcome: Progressing  Chart check complete. Vitals, orders, labs, and progress notes reviewed. Care plan updated. Will monitor.

## 2024-08-28 NOTE — ED NOTES
LOC:  Patient verified via two identifiers.  The patient is awake, alert & oriented to person, place & time. No acute distress noted at this time, pt is speaking appropriately at this time.     APPEARANCE: Patient resting comfortably and appears to be in no acute distress at this time. Patient is clean and well groomed, patient's clothing is properly fastened.     SKIN:The skin is warm, dry & intact. Color consistent with ethnicity, patient has normal skin turgor and moist mucus membranes, skin intact, no breakdown or brusing noted.     MUSCULOSKELETAL: Patient moving all extremities well, no obvious swelling or deformities noted.     RESPIRATORY: Airway is open and patent, respirations are spontaneous, tachypnea noted, but non-labored, no accessory muscle use noted.        CARDIAC: Patient is tachycardic , no periphreal edema noted in any extremity, capillary refill < 3 seconds in all extremities.     ABDOMEN: Soft and non tender to palpation, no abdominal distention noted.      NEUROLOGIC: Sensation is intact. Eyes open spontaneously, PERRLA , behavior appropriate to situation, follows commands. Speech is clear and appropriate. Facial expression symmetrical, bilateral hand grasp equal and even. No bilateral lower extremity edema.

## 2024-08-28 NOTE — PLAN OF CARE
AAOx4; POC reviewed & verbalizes understanding.  Admit DX: DKA/DVT  Afebrile. No acute events on shift. No deficits noted  IV access & IVF: PIV, heparin gtt @ 11.5mL/h, aPTT therapeutic range (40.3) now daily lab draws  Pain on L mid arm (above IV site, no s/s of infiltration/phlebitis, IV flushes w/o difficulty), MD notified...Doppler U/S of HIRA UE  BM: 8/27/24  : WNL  Appetite: adequate  Bed alarm set; fall precautions maintained.   Bed locked in lowest position, side rails up x2, call light within reach, environment clear. Encouraged pt to call nurse with any concerns.  Safety measures maintained

## 2024-08-28 NOTE — PLAN OF CARE
08/27/24 2101   Shift   Virtual Nurse - Rounding Complete   Virtual Nurse - Patient Verbalized Approval Of Camera Use;VN Rounding   Type of Frequent Check   Type Patient Rounds   Safety/Activity   Patient Rounds bed in low position;call light in patient/parent reach;bed wheels locked;clutter free environment maintained;ID band on;placement of personal items at bedside;visualized patient   Safety Promotion/Fall Prevention assistive device/personal item within reach;bed alarm set;Fall Risk reviewed with patient/family;instructed to call staff for mobility;medications reviewed;side rails raised x 2;patient expresses understanding of fall risk and prevention   Positioning   Body Position supine   Head of Bed (HOB) Positioning HOB elevated     Admission questions completed. Introduced patient to VIP model, patient verbalized understanding. Educated patient on fall prevention protocol, updated on plan of care. Opportunity given for pt's questions. All questions answered. Denies needs at this time.

## 2024-08-28 NOTE — PROGRESS NOTES
[facility]           Pharmacy        Current Drug Shortage     Due to national backorder and [facility] is critically low on inventory of Dextrose 50% (D50) Syringes and Vials, pharmacy has automatically switched from D50% to D10% IVPB at the equivalent dose until resolution of the shortage per P&T approved protocol.               Cherelle Burt, [facility]  [phone]

## 2024-08-28 NOTE — PROGRESS NOTES
[facility] Medicine Progress Note    Primary Team: [facility] Hospitalist Team A  Attending Physician: Yvette Vargas MD  Resident: Shelli Jordan DO  Intern: Lew David MD    Date of Admit: 2024     Chief Complaint:   Chief Complaint   Patient presents with    Shortness of Breath     Patient presents to the ED complaining of generalized weakness, shortness of breath when walking, and congestion x3-4 days. Patient sent from MD office for evaluation. CBG over 500 in triage.       Subjective/Interval Events:      Patient not in exam room this morning, getting an echo.    Objective    Objective   Last 24 Hour Vital Signs:  BP  Min: 97/82  Max: 159/90  Temp  Av.3 °F (36.8 °C)  Min: 98.2 °F (36.8 °C)  Max: 98.4 °F (36.9 °C)  Pulse  Av.8  Min: 88  Max: 124  Resp  Av.2  Min: 18  Max: 27  SpO2  Av.7 %  Min: 91 %  Max: 97 %  Height  Av' (152.4 cm)  Min: 5' (152.4 cm)  Max: 5' (152.4 cm)  Weight  Av.6 kg (201 lb 14.6 oz)  Min: 91.2 kg (201 lb)  Max: 92 kg (202 lb 13.2 oz)    Intake/Output Summary (Last 24 hours) at 2024 0651  Last data filed at 2024 0421  Gross per 24 hour   Intake 240 ml   Output --   Net 240 ml       Physical Examination:  Patient not in room    Laboratory:  Recent Labs   Lab 24  1246 24  1635 24  0329   WBC 13.69*  --   --  9.72   HGB 14.5  --   --  11.5*   HCT 40.7  --   --  33.1*     --   --  178   MCV 85  --   --  86   RDW 13.3  --   --  12.1   * 134* 135* 137  137   K 5.1 3.7 4.2 4.0  4.0    104 100 107  107   CO2 15* 19* 17* 20*  20*   BUN 20 16 14 15  15   CREATININE 1.3 0.8 1.0 0.8  0.8   * 318* 468* 289*  289*   PROT 7.9  --   --  5.7*   ALBUMIN 3.9  --   --  2.9*   BILITOT 0.6  --   --  0.4   AST 22  --   --  17   ALKPHOS 204*  --   --  116   ALT 19  --   --  17       Microbiology:  Microbiology Results (last 7 days)       Procedure Component Value Units Date/Time    Influenza A & B  by Molecular [[id number]] Collected: 08/27/24 1242    Order Status: Completed Specimen: Nasopharyngeal Swab Updated: 08/27/24 1314     Influenza A, Molecular Negative     Influenza B, Molecular Negative     Flu A & B Source Nasal swab             Imaging:  US Lower Extremity Veins Bilateral   Final Result      Nonocclusive thrombus is suggested in the left popliteal vein.  Lesser saphenous vein appears thrombosed as well.         Electronically signed by: Dawood Salazar   Date:    08/27/2024   Time:    17:48      CTA Chest Non-Coronary (PE Studies)   Final Result   Abnormal      1. Multiple bilateral pulmonary artery thromboembolism with no saddle embolus..   2. Mild right ventricular strain with RV LV ratio 1.1.   3. Findings communicated with Dr. Jada Lynch in the emergency department via [employer] secure chat with confirmation of receipt.   4. This report was flagged in Epic as abnormal.         Electronically signed by: Dawood Salazar   Date:    08/27/2024   Time:    17:16      X-Ray Chest 1 View   Final Result      No acute process.         Electronically signed by: Robert Carreno MD   Date:    08/27/2024   Time:    14:25           Current Medications:     Scheduled:   insulin glargine U-100  10 Units Subcutaneous QHS         Infusions:   heparin (porcine) in D5W  0-40 Units/kg/hr (Adjusted) Intravenous Continuous 11.5 mL/hr at 08/27/24 1811 18 Units/kg/hr at 08/27/24 1811    lactated ringers   Intravenous Continuous 250 mL/hr at 08/28/24 0251 New Bag at 08/28/24 0251        PRN:    Current Facility-Administered Medications:     acetaminophen, 650 mg, Oral, Q6H PRN    dextrose 10%, 12.5 g, Intravenous, PRN    dextrose 10%, 25 g, Intravenous, PRN    glucagon (human recombinant), 1 mg, Intramuscular, PRN    glucose, 16 g, Oral, PRN    glucose, 24 g, Oral, PRN    heparin (PORCINE), 60 Units/kg (Adjusted), Intravenous, PRN    heparin (PORCINE), 30 Units/kg (Adjusted), Intravenous, PRN    insulin aspart U-100, 0-10  Units, Subcutaneous, QID (AC + HS) PRN    melatonin, 6 mg, Oral, Nightly PRN    pneumoc 20-elizabeth conj-dip cr(PF), 0.5 mL, Intramuscular, vaccine x 1 dose    sodium chloride 0.9%, 10 mL, Intravenous, PRN       Assessment:     Davida Plasencia is a 59 y.o. female with PMH of prediabetes who presented to [facility] on 8/27/2024 for acute-onset SOB.  Patient was admitted to [facility] for DKA, found to have bilateral pulmonary embolisms.     Plan:     #Acute hypoxic respiratory failure  #Submassive bilateral pulmonary embolisms  #DVTs, left lesser saphenous vein and popliteal vein  Pt presented with story of sudden-onset SOB while walking to her neighbors house. She has associated palpitations and lightheadedness. New left calf pain for 1 day. Denies CP. Afebrile. Non-smoker, no significant PMH, no history of recent travel.  - In ED - tachycardic to 117, hypoxic to 86%, tachypneic to 27  - VBG - 7.37/32.5/44.3/18.8  - CXR with no acute process, no focal consolidations  - Afebrile, normotensive, lactate WNL in ED  - Wells score of 4.5  - Bedside POCUS with poor visualization but possible septal flattening  - BNP 29  - CTPE - multiple bilateral pulmonary thromboembolisms with mild right ventricular strain (LV:RV Ratio 1:1)  - DVT US - Nonocclusive thrombus is suggested in the left popliteal vein. Lesser saphenous vein appears thrombosed as well.   Plan:  - Started on Heparin drip  - Consulted cardiology for consideration of catheter-guided therapy  - Cards recommends continued heparin drip - no intervention at this time  - Will continue on heparin drip pending clinical evaluation with plans of transitioning to apixaban     #Diabetic Ketoacidosis  #HAGMA  #T2DM, uncontrolled  She has a remote history of pre-diabetes, previously on Metformin. She had no recent A1C on file and has never used insulin.  - In ED, found to have glucose of 586, BHB 2.5, urine ketones, and an anion gap of 17  - VBG with pH 7.37  - She was given 9 units  subcutaneously in ED with 2L NS  - On repeat labs, gap closed at 11  - A1C on admission >14  - Gap then elevated again to 18 but subsequently closed at 10 after 10u lantus  - Remains closed at 10 this morning  Plan:  - Admitted to floor with gap closed and resolution following initial IVF/insulin  - Continuing q4 BMP to monitor  - Started on 10u Lantus nightly with SSI  - She will need outpatient follow-up for T2DM management     #NSTEMI, type 2  Troponin elevation of 0.105 with no reported chest pain. EKG without evidence of ischemic changes. Found to have submassive PE with evidence of mild right heart strain.  - Repeat troponin plateau at 0.105     #Normocytic Anemia  Hgb 11.5 today from 14.5. Notably, platelets and WBC also decreased following LR bolus + infusion.  - Likely dilutional in setting of IVF  - Will continue to trend    #Hypomagnesemia  Mag of 1.4 - replenishing today    Diet: Diabetic  Code: Full  Dispo: Pending resolution of DKA  Estimated LOS: 24-72 hrs    Raymond David MD   [facility] Internal Medicine PGY-1  [facility] Internal Medicine Team A    [facility] Medicine Hospitalist Pager numbers:   [facility] Hospitalist Medicine Team A (Ctahy/Sam): [phone]  [facility] Hospitalist Medicine Team B (Chandana/Evelyn):  [phone]

## 2024-08-28 NOTE — PLAN OF CARE
SW met with pt at bedside this AM to complete DCA. Pt stated that she was having 0/10 pain and was in a very pleasant mood throughout conversation. Per pt she reported that she lives at home alone and does not use any HME to complete her ADLs. Pt plans to have her aunt Katerina [phone] help transport her home at time of d/c. Pt expressed that she still drives and is not current with any HH or HD facilities. Pt did not have any additional questions or concerns for sw at this time. White board updated with CM name and contact information.  Discharge brochure provided.  Pt encouraged to call with any questions or concerns.  Cm will continue to follow pt through transitions of care and assist with any discharge needs.    DEBORAH Aguilera  [phone]     08/28/24 0940   Discharge Assessment   Assessment Type Discharge Planning Assessment   Confirmed/corrected address, phone number and insurance Yes   Confirmed Demographics Correct on Facesheet   Source of Information patient   When was your last doctors appointment? 08/27/24   Communicated ILA with patient/caregiver Yes   Reason For Admission Diabetic ketoacidosis without coma associated with type 2 diabetes mellitus   People in Home alone   Facility Arrived From: home   Do you expect to return to your current living situation? Yes   Do you have help at home or someone to help you manage your care at home? Yes   Who are your caregiver(s) and their phone number(s)? auncolton Borges [phone]   Prior to hospitilization cognitive status: Alert/Oriented   Current cognitive status: Alert/Oriented   Walking or Climbing Stairs Difficulty no   Dressing/Bathing Difficulty no   Do you have any problems with: Errands/Grocery   Home Accessibility wheelchair accessible   Home Layout Able to live on 1st floor   Equipment Currently Used at Home none   Readmission within 30 days? No   Patient currently being followed by outpatient case management? No   Do you currently have service(s)  that help you manage your care at home? No   Do you take prescription medications? Yes   Do you have prescription coverage? Yes   Coverage BCBS   Do you have any problems affording any of your prescribed medications? No   Is the patient taking medications as prescribed? yes   Who is going to help you get home at discharge? auncolton Borges [phone]   How do you get to doctors appointments? family or friend will provide   Are you on dialysis? No   Do you take coumadin? No   Discharge Plan A Home with family   DME Needed Upon Discharge  none   Discharge Plan discussed with: Patient   Transition of Care Barriers None   Physical Activity   On average, how many days per week do you engage in moderate to strenuous exercise (like a brisk walk)? 1 day   On average, how many minutes do you engage in exercise at this level? 10 min   Financial Resource Strain   How hard is it for you to pay for the very basics like food, housing, medical care, and heating? Not hard   Housing Stability   In the last 12 months, was there a time when you were not able to pay the mortgage or rent on time? N   At any time in the past 12 months, were you homeless or living in a shelter (including now)? N   Transportation Needs   Has the lack of transportation kept you from medical appointments, meetings, work or from getting things needed for daily living? No   Food Insecurity   Within the past 12 months, you worried that your food would run out before you got the money to buy more. Never true   Within the past 12 months, the food you bought just didn't last and you didn't have money to get more. Never true   Stress   Do you feel stress - tense, restless, nervous, or anxious, or unable to sleep at night because your mind is troubled all the time - these days? Rather much   Social Isolation   How often do you feel lonely or isolated from those around you?  Never   Alcohol Use   Q1: How often do you have a drink containing alcohol? Monthly or l   Q2:  How many drinks containing alcohol do you have on a typical day when you are drinking? None   Q3: How often do you have six or more drinks on one occasion? Never   Utilities   In the past 12 months has the electric, gas, oil, or water company threatened to shut off services in your home? No   Health Literacy   How often do you need to have someone help you when you read instructions, pamphlets, or other written material from your doctor or pharmacy? Sometimes

## 2024-08-28 NOTE — CONSULTS
"Cardiology Consult Note     Cardiology Attending: Dr. Nieves Block MD  Cardiology Fellow: Richard Hou MD     Date of Admit: 8/27/2024  Date of Consult: 8/28/2024    Reason for Consultation:     "Pulmonary embolism"    History of Present Illness:      Davida Plasencia is a 59 y.o. female with a PMH significant for pre DM (was on metformin but stopped around 2022), class II obesity who presented to [facility] with c/o shortness of breath that started yesterday morning. Pt was in her usual state of health when she noticed acute onset SOB around 7am, a/w palpitations following which she presented to the ER. She denies chest pain, orthopnea, PND, dizziness, syncope,N/V, abdominal pain. She did notice slight swelling and mild pain of her LLE.  Denies any recent travel, OCP use, cancer diagnosis/therapy. In the ER, she was tachycardic and tachypneic, BP stable. Labs consistent with DKA. Troponin was slightly elevated at 0.105 which downtrended to 0.093. BNP WNL. EKG showed SR .Imaging with multiple bilateral pulmonary artery thromboembolism with no saddle embolus, mild right ventricular strain with RV LV ratio 1.1. DVT US showed nonocclusive thrombus in the left popliteal vein and lesser saphenous vein. Cardiology consulted for PE    PMH: Pre-DM, obesity class II  PSH: hysterectomy  Social History: denies smoking, rare alcohol use, denies illicit drug use     Review of Systems:     ROS  Medications:     Home Medications:  Prior to Admission medications    Medication Sig Start Date End Date Taking? Authorizing Provider   cetirizine (ZYRTEC) 10 MG tablet Take 10 mg by mouth once daily.   Yes Provider, Historical   ibuprofen (ADVIL,MOTRIN) 200 MG tablet Take 200 mg by mouth every 6 (six) hours as needed for Pain.   Yes Provider, Historical   ALPRAZolam (XANAX) 0.5 MG tablet Take 0.5 mg by mouth daily as needed.    Provider, Historical   insulin glargine U-100, Lantus, (BASAGLAR KWIKPEN U-100 INSULIN) 100 unit/mL " (3 mL) InPn pen Inject 20 Units into the skin every evening.    Provider, Historical       Current/Inpatient Medications:  Infusions:   heparin (porcine) in D5W  0-40 Units/kg/hr (Adjusted) Intravenous Continuous 11.5 mL/hr at 08/27/24 1811 18 Units/kg/hr at 08/27/24 1811     Scheduled:   insulin aspart U-100  5 Units Subcutaneous TIDWM    insulin glargine U-100  15 Units Subcutaneous QHS     PRN:    Current Facility-Administered Medications:     acetaminophen, 650 mg, Oral, Q6H PRN    dextrose 10%, 12.5 g, Intravenous, PRN    dextrose 10%, 25 g, Intravenous, PRN    glucagon (human recombinant), 1 mg, Intramuscular, PRN    glucose, 16 g, Oral, PRN    glucose, 24 g, Oral, PRN    heparin (PORCINE), 60 Units/kg (Adjusted), Intravenous, PRN    heparin (PORCINE), 30 Units/kg (Adjusted), Intravenous, PRN    insulin aspart U-100, 0-10 Units, Subcutaneous, QID (AC + HS) PRN    melatonin, 6 mg, Oral, Nightly PRN    pneumoc 20-elizabeth conj-dip cr(PF), 0.5 mL, Intramuscular, vaccine x 1 dose    sodium chloride 0.9%, 10 mL, Intravenous, PRN    Objective:     24-hour Vitals:   Temp:  [97.5 °F (36.4 °C)-98.3 °F (36.8 °C)] 98.1 °F (36.7 °C)  Pulse:  [] 95  Resp:  [18-27] 18  SpO2:  [93 %-97 %] 97 %  BP: ()/(66-90) 124/66    Vitals: /66 (BP Location: Right arm, Patient Position: Lying)   Pulse 95   Temp 98.1 °F (36.7 °C) (Oral)   Resp 18   Ht 5' (1.524 m)   Wt 91.6 kg (202 lb)   SpO2 97%   BMI 39.45 kg/m²     Intake/Output Summary (Last 24 hours) at 8/28/2024 1304  Last data filed at 8/28/2024 0421  Gross per 24 hour   Intake 240 ml   Output --   Net 240 ml       Physical Exam   General: Alert. Responsive. Not in acute distress.  HEENT: Normocephalic, Atraumatic, No conjunctival icterus  Neck: No JVD  Pulm: CTAB. no exp wheezes. no crackles. symmetrical chest expansion.  Cardio: RRR. no appreciable murmurs/gallops.  Abdomen: BS+, soft, non-distended, non-tender  Extremity: LLE slightly more swollen than RLE, no  overt pedal edema . good equal pulses felt bilaterally in all extremities.  Neurologic: AAOx3. Responds to questions/commands appropriately.  MSK: No obvious deformities. Moving all extremities purposefully.  Skin: Warm, dry and without rashes.    Labs:   I have reviewed the following labs below:    Recent Labs   Lab 08/27/24  1246 08/27/24  1635 08/27/24  1806 08/27/24 2002 08/28/24  0329 08/28/24  1020   WBC 13.69*  --   --   --  9.72  --    HGB 14.5  --   --   --  11.5*  --    HCT 40.7  --   --   --  33.1*  --      --   --   --  178  --    * 134*  --  135* 137  137 133*   K 5.1 3.7  --  4.2 4.0  4.0 4.3    104  --  100 107  107 104   CO2 15* 19*  --  17* 20*  20* 19*   BUN 20 16  --  14 15  15 15   CREATININE 1.3 0.8  --  1.0 0.8  0.8 0.8   * 318*  --  468* 289*  289* 330*   CALCIUM 9.7 8.3*  --  8.7 8.5*  8.5* 8.4*   MG  --   --   --  1.5* 1.4*  --    PHOS  --   --   --  3.3 3.0  --    PROT 7.9  --   --   --  5.7*  --    ALBUMIN 3.9  --   --   --  2.9*  --    BILITOT 0.6  --   --   --  0.4  --    AST 22  --   --   --  17  --    ALT 19  --   --   --  17  --    ALKPHOS 204*  --   --   --  116  --    INR  --   --  1.1  --   --   --    TROPONINI 0.105* 0.105*  --  0.093*  --   --    BNP 29  --   --   --   --   --      Cardiovascular Studies/Imaging:   I have reviewed the following studies below:    ECG:  Sr with nonspecific T wave abnormality    TTE (8/28/24):     Left Ventricle: The left ventricle is normal in size. There is normal systolic function with a visually estimated ejection fraction of 55 - 60%. Grade I diastolic dysfunction.   Right Ventricle: Moderate right ventricular enlargement. Systolic function is normal.   Right Atrium: Right atrium is mildly dilated.   Aortic Valve: The aortic valve is a trileaflet valve.   Tricuspid Valve: There is mild regurgitation with a centrally directed jet.   Pulmonary Artery: No pulmonary hypertension. The estimated pulmonary artery  systolic pressure is 29 mmHg.   IVC/SVC: Normal venous pressure at 3 mmHg.       Imaging:   US Lower Extremity Veins Bilateral    Result Date: 8/27/2024  Nonocclusive thrombus is suggested in the left popliteal vein.  Lesser saphenous vein appears thrombosed as well.     CTA Chest Non-Coronary (PE Studies)  1. Multiple bilateral pulmonary artery thromboembolism with no saddle embolus.. 2. Mild right ventricular strain with RV LV ratio 1.1.     Assessment:     Davida Plasencia is a 59 y.o. female with a PMH significant for pre DM (was on metformin but stopped around 2022), class II obesity who was admitted for DKA and pulmonary embolism    #Intermediate risk PE  #DVT  #DKA  #Class 2 obesity    Plan/Recommendations:     -Given stable BP, minimal O2 requirements and clinical improvement today, will avoid any interventional procedures (catheter directed thrombolysis/thrombectomy). Will consider if she is hemodynamically unstable/clinically deteriorates  -Continue heparin gtt and transition to DOAC later today or tomorrow  -Uncertain about etiology of PE, could be related to sedentary lifestyle. Will need to continue DOAC for at least 6-8 months and reassess on an outpatient basis  -Troponin elevation type 2 MI 2/2 PE. Trop peaked at 0.105, do not need to trend further  -Replete electrolytes to maintain K>4, Mg>2  -DKA/DM management per primary  -no further recommendations from a cardiac standpoint. We will be available as needed    Trever Ramos MD  [facility] Cardiology Fellow, PGY-5  [facility]

## 2024-08-29 LAB
ALBUMIN SERPL BCP-MCNC: 3.1 G/DL (ref 3.5–5.2)
ALP SERPL-CCNC: 136 U/L (ref 55–135)
ALT SERPL W/O P-5'-P-CCNC: 67 U/L (ref 10–44)
ANION GAP SERPL CALC-SCNC: 14 MMOL/L (ref 8–16)
APTT PPP: 39.3 SEC (ref 21–32)
AST SERPL-CCNC: 65 U/L (ref 10–40)
BASOPHILS # BLD AUTO: 0.05 K/UL (ref 0–0.2)
BASOPHILS NFR BLD: 0.5 % (ref 0–1.9)
BILIRUB SERPL-MCNC: 0.4 MG/DL (ref 0.1–1)
BUN SERPL-MCNC: 14 MG/DL (ref 6–20)
CALCIUM SERPL-MCNC: 8.5 MG/DL (ref 8.7–10.5)
CHLORIDE SERPL-SCNC: 102 MMOL/L (ref 95–110)
CO2 SERPL-SCNC: 20 MMOL/L (ref 23–29)
CREAT SERPL-MCNC: 0.8 MG/DL (ref 0.5–1.4)
DIFFERENTIAL METHOD BLD: ABNORMAL
EOSINOPHIL # BLD AUTO: 0.3 K/UL (ref 0–0.5)
EOSINOPHIL NFR BLD: 2.8 % (ref 0–8)
ERYTHROCYTE [DISTWIDTH] IN BLOOD BY AUTOMATED COUNT: 12 % (ref 11.5–14.5)
EST. GFR  (NO RACE VARIABLE): >60 ML/MIN/1.73 M^2
GLUCOSE SERPL-MCNC: 223 MG/DL (ref 70–110)
HCT VFR BLD AUTO: 33.7 % (ref 37–48.5)
HGB BLD-MCNC: 11.6 G/DL (ref 12–16)
IMM GRANULOCYTES # BLD AUTO: 0.06 K/UL (ref 0–0.04)
IMM GRANULOCYTES NFR BLD AUTO: 0.6 % (ref 0–0.5)
LYMPHOCYTES # BLD AUTO: 2.7 K/UL (ref 1–4.8)
LYMPHOCYTES NFR BLD: 27.9 % (ref 18–48)
MAGNESIUM SERPL-MCNC: 1.6 MG/DL (ref 1.6–2.6)
MCH RBC QN AUTO: 30.1 PG (ref 27–31)
MCHC RBC AUTO-ENTMCNC: 34.4 G/DL (ref 32–36)
MCV RBC AUTO: 88 FL (ref 82–98)
MONOCYTES # BLD AUTO: 0.7 K/UL (ref 0.3–1)
MONOCYTES NFR BLD: 7.2 % (ref 4–15)
NEUTROPHILS # BLD AUTO: 5.9 K/UL (ref 1.8–7.7)
NEUTROPHILS NFR BLD: 61 % (ref 38–73)
NRBC BLD-RTO: 0 /100 WBC
PHOSPHATE SERPL-MCNC: 3.1 MG/DL (ref 2.7–4.5)
PLATELET # BLD AUTO: 203 K/UL (ref 150–450)
PMV BLD AUTO: 10.2 FL (ref 9.2–12.9)
POCT GLUCOSE: 207 MG/DL (ref 70–110)
POCT GLUCOSE: 225 MG/DL (ref 70–110)
POCT GLUCOSE: 253 MG/DL (ref 70–110)
POCT GLUCOSE: 280 MG/DL (ref 70–110)
POTASSIUM SERPL-SCNC: 3.8 MMOL/L (ref 3.5–5.1)
PROT SERPL-MCNC: 6 G/DL (ref 6–8.4)
RBC # BLD AUTO: 3.85 M/UL (ref 4–5.4)
SODIUM SERPL-SCNC: 136 MMOL/L (ref 136–145)
WBC # BLD AUTO: 9.65 K/UL (ref 3.9–12.7)

## 2024-08-29 PROCEDURE — 25000003 PHARM REV CODE 250: Performed by: STUDENT IN AN ORGANIZED HEALTH CARE EDUCATION/TRAINING PROGRAM

## 2024-08-29 PROCEDURE — 25000003 PHARM REV CODE 250

## 2024-08-29 PROCEDURE — 85025 COMPLETE CBC W/AUTO DIFF WBC: CPT | Performed by: STUDENT IN AN ORGANIZED HEALTH CARE EDUCATION/TRAINING PROGRAM

## 2024-08-29 PROCEDURE — 36415 COLL VENOUS BLD VENIPUNCTURE: CPT | Performed by: INTERNAL MEDICINE

## 2024-08-29 PROCEDURE — 84100 ASSAY OF PHOSPHORUS: CPT | Performed by: STUDENT IN AN ORGANIZED HEALTH CARE EDUCATION/TRAINING PROGRAM

## 2024-08-29 PROCEDURE — 85730 THROMBOPLASTIN TIME PARTIAL: CPT | Performed by: INTERNAL MEDICINE

## 2024-08-29 PROCEDURE — 80053 COMPREHEN METABOLIC PANEL: CPT | Performed by: STUDENT IN AN ORGANIZED HEALTH CARE EDUCATION/TRAINING PROGRAM

## 2024-08-29 PROCEDURE — 11000001 HC ACUTE MED/SURG PRIVATE ROOM

## 2024-08-29 PROCEDURE — 63600175 PHARM REV CODE 636 W HCPCS: Performed by: STUDENT IN AN ORGANIZED HEALTH CARE EDUCATION/TRAINING PROGRAM

## 2024-08-29 PROCEDURE — 83735 ASSAY OF MAGNESIUM: CPT | Performed by: STUDENT IN AN ORGANIZED HEALTH CARE EDUCATION/TRAINING PROGRAM

## 2024-08-29 PROCEDURE — 63600175 PHARM REV CODE 636 W HCPCS

## 2024-08-29 RX ORDER — INSULIN ASPART 100 [IU]/ML
7 INJECTION, SOLUTION INTRAVENOUS; SUBCUTANEOUS
Status: DISCONTINUED | OUTPATIENT
Start: 2024-08-29 | End: 2024-08-30 | Stop reason: HOSPADM

## 2024-08-29 RX ORDER — INSULIN GLARGINE 100 [IU]/ML
7 INJECTION, SOLUTION SUBCUTANEOUS ONCE
Status: COMPLETED | OUTPATIENT
Start: 2024-08-29 | End: 2024-08-29

## 2024-08-29 RX ORDER — INSULIN GLARGINE 100 [IU]/ML
20 INJECTION, SOLUTION SUBCUTANEOUS NIGHTLY
Status: DISCONTINUED | OUTPATIENT
Start: 2024-08-29 | End: 2024-08-30 | Stop reason: HOSPADM

## 2024-08-29 RX ORDER — CETIRIZINE HYDROCHLORIDE 5 MG/1
5 TABLET ORAL NIGHTLY PRN
Status: DISCONTINUED | OUTPATIENT
Start: 2024-08-29 | End: 2024-08-30 | Stop reason: HOSPADM

## 2024-08-29 RX ORDER — MAGNESIUM SULFATE HEPTAHYDRATE 40 MG/ML
2 INJECTION, SOLUTION INTRAVENOUS ONCE
Status: COMPLETED | OUTPATIENT
Start: 2024-08-29 | End: 2024-08-29

## 2024-08-29 RX ORDER — INSULIN GLARGINE 100 [IU]/ML
5 INJECTION, SOLUTION SUBCUTANEOUS ONCE
Status: DISCONTINUED | OUTPATIENT
Start: 2024-08-29 | End: 2024-08-29

## 2024-08-29 RX ADMIN — INSULIN ASPART 4 UNITS: 100 INJECTION, SOLUTION INTRAVENOUS; SUBCUTANEOUS at 05:08

## 2024-08-29 RX ADMIN — INSULIN GLARGINE 7 UNITS: 100 INJECTION, SOLUTION SUBCUTANEOUS at 11:08

## 2024-08-29 RX ADMIN — INSULIN ASPART 2 UNITS: 100 INJECTION, SOLUTION INTRAVENOUS; SUBCUTANEOUS at 09:08

## 2024-08-29 RX ADMIN — MAGNESIUM SULFATE HEPTAHYDRATE 2 G: 40 INJECTION, SOLUTION INTRAVENOUS at 08:08

## 2024-08-29 RX ADMIN — INSULIN ASPART 7 UNITS: 100 INJECTION, SOLUTION INTRAVENOUS; SUBCUTANEOUS at 05:08

## 2024-08-29 RX ADMIN — INSULIN ASPART 6 UNITS: 100 INJECTION, SOLUTION INTRAVENOUS; SUBCUTANEOUS at 11:08

## 2024-08-29 RX ADMIN — INSULIN ASPART 5 UNITS: 100 INJECTION, SOLUTION INTRAVENOUS; SUBCUTANEOUS at 11:08

## 2024-08-29 RX ADMIN — CETIRIZINE HYDROCHLORIDE 5 MG: 5 TABLET, FILM COATED ORAL at 01:08

## 2024-08-29 RX ADMIN — POTASSIUM BICARBONATE 10 MEQ: 391 TABLET, EFFERVESCENT ORAL at 08:08

## 2024-08-29 RX ADMIN — APIXABAN 10 MG: 5 TABLET, FILM COATED ORAL at 11:08

## 2024-08-29 RX ADMIN — INSULIN ASPART 6 UNITS: 100 INJECTION, SOLUTION INTRAVENOUS; SUBCUTANEOUS at 06:08

## 2024-08-29 RX ADMIN — APIXABAN 10 MG: 5 TABLET, FILM COATED ORAL at 09:08

## 2024-08-29 RX ADMIN — Medication 6 MG: at 09:08

## 2024-08-29 RX ADMIN — INSULIN GLARGINE 20 UNITS: 100 INJECTION, SOLUTION SUBCUTANEOUS at 09:08

## 2024-08-29 RX ADMIN — INSULIN ASPART 5 UNITS: 100 INJECTION, SOLUTION INTRAVENOUS; SUBCUTANEOUS at 07:08

## 2024-08-29 NOTE — PLAN OF CARE
Problem: Adult Inpatient Plan of Care  Goal: Plan of Care Review  Outcome: Progressing     Vital signs, labs, progress notes and care plan reviewed.

## 2024-08-29 NOTE — PLAN OF CARE
08/29/24 1524   Post-Acute Status   Post-Acute Authorization Other   Other Status See Comments   Discharge Delays None known at this time       Orders Placed This Encounter   Procedures    Ambulatory referral/consult to Hematology / Oncology     Standing Status:   Future     Standing Expiration Date:   9/29/2025     Referral Priority:   Routine     Referral Type:   Consultation     Referral Reason:   Specialty Services Required     Requested Specialty:   Hematology and Oncology     Number of Visits Requested:   1    Ambulatory referral/consult to Diabetes Education     Standing Status:   Future     Standing Expiration Date:   8/29/2025     Referral Priority:   Routine     Referral Type:   Consultation     Referral Reason:   Specialty Services Required     Requested Specialty:   Diabetes     Number of Visits Requested:   1     Expiration Date:   8/29/2025

## 2024-08-29 NOTE — PROGRESS NOTES
[facility] Medicine Progress Note    Primary Team: \[facility]\"" Hospitalist Team A  Attending Physician: Yvette Vargas MD  Resident: Shelli Jordan DO  Intern: Lew David MD    Date of Admit: 2024     Chief Complaint:   Chief Complaint   Patient presents with    Shortness of Breath     Patient presents to the ED complaining of generalized weakness, shortness of breath when walking, and congestion x3-4 days. Patient sent from MD office for evaluation. CBG over 500 in triage.       Subjective/Interval Events:      She is resting in bed comfortably with no new complaints. States she has been able to ambulate around her room with improving SOB.    Objective    Objective   Last 24 Hour Vital Signs:  BP  Min: 118/70  Max: 154/74  Temp  Av °F (36.7 °C)  Min: 97.5 °F (36.4 °C)  Max: 98.1 °F (36.7 °C)  Pulse  Av.8  Min: 82  Max: 117  Resp  Av.7  Min: 18  Max: 20  SpO2  Av.6 %  Min: 93 %  Max: 99 %  Height  Av' (152.4 cm)  Min: 5' (152.4 cm)  Max: 5' (152.4 cm)  Weight  Av.6 kg (202 lb)  Min: 91.6 kg (202 lb)  Max: 91.6 kg (202 lb)    Intake/Output Summary (Last 24 hours) at 2024 0644  Last data filed at 2024  Gross per 24 hour   Intake 120 ml   Output --   Net 120 ml       Physical Examination:  Physical Exam  Constitutional:       General: She is not in acute distress.     Appearance: She is not toxic-appearing.   HENT:      Mouth/Throat:      Pharynx: Oropharynx is clear.   Eyes:      General: No visual field deficit.     Extraocular Movements: Extraocular movements intact.      Conjunctiva/sclera: Conjunctivae normal.   Cardiovascular:      Rate and Rhythm: Normal rate and regular rhythm.      Pulses: Normal pulses.      Heart sounds: No murmur heard.     No gallop.   Pulmonary:      Effort: Pulmonary effort is normal. No respiratory distress.      Breath sounds: Normal breath sounds. No wheezing.   Abdominal:      General: Abdomen is flat. There is no distension.       Palpations: Abdomen is soft.      Tenderness: There is no abdominal tenderness. There is no guarding.      Comments: Negative glynn   Musculoskeletal:         General: No tenderness.      Left lower leg: Edema (slight increase in diameter compared to right) present.   Skin:     General: Skin is warm and dry.   Neurological:      General: No focal deficit present.      Mental Status: She is alert and oriented to person, place, and time. Mental status is at baseline.      Cranial Nerves: No dysarthria or facial asymmetry.   Psychiatric:         Mood and Affect: Mood normal.         Behavior: Behavior normal.          Laboratory:  Recent Labs   Lab 08/27/24  1246 08/27/24  1635 08/28/24  0329 08/28/24  1020 08/28/24  1227 08/28/24  1603 08/29/24  0229   WBC 13.69*  --  9.72  --   --   --  9.65   HGB 14.5  --  11.5*  --   --   --  11.6*   HCT 40.7  --  33.1*  --   --   --  33.7*     --  178  --   --   --  203   MCV 85  --  86  --   --   --  88   RDW 13.3  --  12.1  --   --   --  12.0   *   < > 137  137   < > 134* 133* 136   K 5.1   < > 4.0  4.0   < > 4.5 3.9 3.8      < > 107  107   < > 105 103 102   CO2 15*   < > 20*  20*   < > 18* 19* 20*   BUN 20   < > 15  15   < > 14 17 14   CREATININE 1.3   < > 0.8  0.8   < > 0.8 0.8 0.8   *   < > 289*  289*   < > 328* 270* 223*   PROT 7.9  --  5.7*  --   --   --  6.0   ALBUMIN 3.9  --  2.9*  --   --   --  3.1*   BILITOT 0.6  --  0.4  --   --   --  0.4   AST 22  --  17  --   --   --  65*   ALKPHOS 204*  --  116  --   --   --  136*   ALT 19  --  17  --   --   --  67*    < > = values in this interval not displayed.       Microbiology:  Microbiology Results (last 7 days)       Procedure Component Value Units Date/Time    Influenza A & B by Molecular [[id number]] Collected: 08/27/24 1242    Order Status: Completed Specimen: Nasopharyngeal Swab Updated: 08/27/24 1314     Influenza A, Molecular Negative     Influenza B, Molecular Negative     Flu A & B  Source Nasal swab             Imaging:  US Upper Extremity Veins Bilateral   Final Result      No thrombus in the central veins of the right or left upper extremity.         Electronically signed by: Deny Piedra   Date:    08/28/2024   Time:    13:57      US Lower Extremity Veins Bilateral   Final Result      Nonocclusive thrombus is suggested in the left popliteal vein.  Lesser saphenous vein appears thrombosed as well.         Electronically signed by: Dawood Salazar   Date:    08/27/2024   Time:    17:48      CTA Chest Non-Coronary (PE Studies)   Final Result   Abnormal      1. Multiple bilateral pulmonary artery thromboembolism with no saddle embolus..   2. Mild right ventricular strain with RV LV ratio 1.1.   3. Findings communicated with Dr. Jada Lynch in the emergency department via [employer] secure chat with confirmation of receipt.   4. This report was flagged in Epic as abnormal.         Electronically signed by: Dawood Salazar   Date:    08/27/2024   Time:    17:16      X-Ray Chest 1 View   Final Result      No acute process.         Electronically signed by: Robert Carreno MD   Date:    08/27/2024   Time:    14:25           Current Medications:     Scheduled:   insulin aspart U-100  5 Units Subcutaneous TIDWM    insulin glargine U-100  15 Units Subcutaneous QHS         Infusions:   heparin (porcine) in D5W  0-40 Units/kg/hr (Adjusted) Intravenous Continuous 11.5 mL/hr at 08/28/24 1415 18.025 Units/kg/hr at 08/28/24 1415        PRN:    Current Facility-Administered Medications:     acetaminophen, 650 mg, Oral, Q6H PRN    cetirizine, 5 mg, Oral, Nightly PRN    dextrose 10%, 12.5 g, Intravenous, PRN    dextrose 10%, 25 g, Intravenous, PRN    glucagon (human recombinant), 1 mg, Intramuscular, PRN    glucose, 16 g, Oral, PRN    glucose, 24 g, Oral, PRN    heparin (PORCINE), 60 Units/kg (Adjusted), Intravenous, PRN    heparin (PORCINE), 30 Units/kg (Adjusted), Intravenous, PRN    insulin aspart U-100, 0-10  Units, Subcutaneous, QID (AC + HS) PRN    melatonin, 6 mg, Oral, Nightly PRN    pneumoc 20-elizabeth conj-dip cr(PF), 0.5 mL, Intramuscular, vaccine x 1 dose    sodium chloride 0.9%, 10 mL, Intravenous, PRN       Assessment:     Davida Plasencia is a 59 y.o. female with PMH of prediabetes who presented to [facility] on 8/27/2024 for acute-onset SOB.  Patient was admitted to [facility] for DKA, found to have bilateral pulmonary embolisms.     Plan:     #Acute hypoxic respiratory failure  #Submassive bilateral pulmonary embolisms  #DVTs, left lesser saphenous vein and popliteal vein  Pt presented with story of sudden-onset SOB while walking to her neighbors house. She has associated palpitations and lightheadedness. New left calf pain for 1 day. Denies CP. Afebrile. Non-smoker, no significant PMH, no history of recent travel.  - In ED - tachycardic to 117, hypoxic to 86%, tachypneic to 27  - VBG in ED - 7.37/32.5/44.3/18.8  - CXR with no acute process, no focal consolidations  - Afebrile, normotensive, lactate WNL in ED  - Wells score of 4.5  - BNP 29, trop 0.105  - CTPE - multiple bilateral pulmonary thromboembolisms with mild right ventricular strain (LV:RV Ratio 1:1)  - DVT US - Nonocclusive thrombus is suggested in the left popliteal vein. Lesser saphenous vein appears thrombosed as well.   - Consulted cardiology for consideration of catheter-guided therapy  - Cards recommends no intervention at this time- continued medical management  - TTE - moderate right ventricular enlargement, EF 55-60%  - She has an intermediate risk profile  Plan:  - Has been on Heparin drip x 2 days  - Transitioning to Eliquis 10 mg BID x 7 days, then Eliquis 5 mg BID indefinitely with close follow-up with cardiology outpatient  - No current identifiable provocation of her PE, she may require lifetime AC     #Diabetic Ketoacidosis  #HAGMA  #T2DM, uncontrolled  She has a remote history of pre-diabetes, previously on Metformin. She had no recent A1C on  file and has never used insulin.  - In ED, found to have glucose of 586, BHB 2.5, urine ketones, and an anion gap of 17  - VBG with pH 7.37  - She was given 9 units subcutaneously in ED with 2L NS  - On repeat labs, gap closed at 11  - A1C on admission >14  - Gap then elevated again to 18 but subsequently closed at 10 after 10u lantus  - Bicarb 20, gap remains closed  - Glucose downtrending to 223 today  Plan:  - Admitted to floor with resolution following initial IVF/insulin  - Continuing q4 BMP to monitor  - Started on 10u Lantus nightly with SSI  - She will need outpatient follow-up for T2DM management     #NSTEMI, type 2  Troponin elevation of 0.105 with no reported chest pain. EKG without evidence of ischemic changes. Found to have submassive PE with evidence of mild right heart strain.  - Repeat troponin plateau at 0.105     #Normocytic Anemia  Hgb 11.5 from 14.5. Notably, platelets and WBC also decreased following LR bolus + infusion.  - May be dilutional in setting of IVF  - 11.6 today, will continue to monitor  - If still anemic following discontinuation of fluids, will obtain iron studies    #Transaminitis  Mild elevation in AST/ALT to 65/67 from 17/17.   from 116, was initially elevated at 204  - Unclear explanation at this time  - Will continue to monitor trend    #Hypomagnesemia  Mag of 1.4 -> 1.6  Replenishing today    Diet: Diabetic  Code: Full  Dispo: Likely discharge soon, transitioning AC  Estimated LOS: 24-72 hrs    Raymond David MD   [facility] PGY-1  [facility] Team A    [facility] Hospitalist Pager numbers:   [facility] Hospitalist Medicine Team A (Cathy/Sam):   [facility] Hospitalist Medicine Team B (Chandana/Evelyn):  [phone]

## 2024-08-29 NOTE — PLAN OF CARE
Problem: Adult Inpatient Plan of Care  Goal: Plan of Care Review  Outcome: Progressing  Goal: Patient-Specific Goal (Individualized)  Outcome: Progressing  Goal: Absence of Hospital-Acquired Illness or Injury  Outcome: Progressing  Goal: Optimal Comfort and Wellbeing  Outcome: Progressing     Problem: Diabetes Comorbidity  Goal: Blood Glucose Level Within Targeted Range  Outcome: Progressing     Problem: Fall Injury Risk  Goal: Absence of Fall and Fall-Related Injury  Outcome: Progressing     Problem: Glycemic Control Impaired  Goal: Blood Glucose Level Within Targeted Range  Outcome: Progressing     Problem: VTE (Venous Thromboembolism)  Goal: Tissue Perfusion  Outcome: Progressing     POC reviewed with patient. All questions and concerns addressed. Fall/safety precautions implemented and maintained. Blood glucose monitored. Heparin gtt in progress. PTT therapeutic this am. Next draw with daily am labs. No acute events noted this shift. Please see flowsheet for full assessment and vitals. Bed locked in lowest position. Side rails up x2. Call bell within reach.

## 2024-08-29 NOTE — PROGRESS NOTES
Checked on patient today who is v cathryn comfortable off oxygen with no complaints. Agree with plans to transition to eliquis and will follow her as outpatient and recheck echo

## 2024-08-30 VITALS
HEART RATE: 66 BPM | SYSTOLIC BLOOD PRESSURE: 140 MMHG | OXYGEN SATURATION: 100 % | BODY MASS INDEX: 40.47 KG/M2 | WEIGHT: 206.13 LBS | TEMPERATURE: 98 F | DIASTOLIC BLOOD PRESSURE: 76 MMHG | RESPIRATION RATE: 18 BRPM | HEIGHT: 60 IN

## 2024-08-30 PROBLEM — E87.29 HIGH ANION GAP METABOLIC ACIDOSIS: Status: RESOLVED | Noted: 2024-08-27 | Resolved: 2024-08-30

## 2024-08-30 PROBLEM — E11.10 DIABETIC KETOACIDOSIS WITHOUT COMA ASSOCIATED WITH TYPE 2 DIABETES MELLITUS: Status: RESOLVED | Noted: 2024-08-27 | Resolved: 2024-08-30

## 2024-08-30 PROBLEM — R07.9 CHEST PAIN: Status: RESOLVED | Noted: 2024-08-30 | Resolved: 2024-08-30

## 2024-08-30 PROBLEM — R06.02 SHORTNESS OF BREATH: Status: RESOLVED | Noted: 2024-08-30 | Resolved: 2024-08-30

## 2024-08-30 PROBLEM — R07.9 CHEST PAIN: Status: ACTIVE | Noted: 2024-08-30

## 2024-08-30 PROBLEM — J96.01 ACUTE HYPOXIC RESPIRATORY FAILURE: Status: RESOLVED | Noted: 2024-08-27 | Resolved: 2024-08-30

## 2024-08-30 PROBLEM — R79.89 ELEVATED TROPONIN: Status: RESOLVED | Noted: 2024-08-28 | Resolved: 2024-08-30

## 2024-08-30 PROBLEM — E83.42 HYPOMAGNESEMIA: Status: RESOLVED | Noted: 2024-08-28 | Resolved: 2024-08-30

## 2024-08-30 PROBLEM — R06.02 SHORTNESS OF BREATH: Status: ACTIVE | Noted: 2024-08-30

## 2024-08-30 LAB
ALBUMIN SERPL BCP-MCNC: 3.1 G/DL (ref 3.5–5.2)
ALP SERPL-CCNC: 132 U/L (ref 55–135)
ALT SERPL W/O P-5'-P-CCNC: 53 U/L (ref 10–44)
ANION GAP SERPL CALC-SCNC: 11 MMOL/L (ref 8–16)
AST SERPL-CCNC: 30 U/L (ref 10–40)
BASOPHILS # BLD AUTO: 0.04 K/UL (ref 0–0.2)
BASOPHILS NFR BLD: 0.5 % (ref 0–1.9)
BILIRUB SERPL-MCNC: 0.4 MG/DL (ref 0.1–1)
BUN SERPL-MCNC: 11 MG/DL (ref 6–20)
CALCIUM SERPL-MCNC: 8.7 MG/DL (ref 8.7–10.5)
CHLORIDE SERPL-SCNC: 105 MMOL/L (ref 95–110)
CO2 SERPL-SCNC: 23 MMOL/L (ref 23–29)
CREAT SERPL-MCNC: 0.8 MG/DL (ref 0.5–1.4)
DIFFERENTIAL METHOD BLD: ABNORMAL
EOSINOPHIL # BLD AUTO: 0.3 K/UL (ref 0–0.5)
EOSINOPHIL NFR BLD: 3.9 % (ref 0–8)
ERYTHROCYTE [DISTWIDTH] IN BLOOD BY AUTOMATED COUNT: 12.3 % (ref 11.5–14.5)
EST. GFR  (NO RACE VARIABLE): >60 ML/MIN/1.73 M^2
FERRITIN SERPL-MCNC: 296 NG/ML (ref 20–300)
GLUCOSE SERPL-MCNC: 180 MG/DL (ref 70–110)
HCT VFR BLD AUTO: 34.5 % (ref 37–48.5)
HGB BLD-MCNC: 11.6 G/DL (ref 12–16)
IMM GRANULOCYTES # BLD AUTO: 0.06 K/UL (ref 0–0.04)
IMM GRANULOCYTES NFR BLD AUTO: 0.7 % (ref 0–0.5)
IRON SERPL-MCNC: 39 UG/DL (ref 30–160)
LYMPHOCYTES # BLD AUTO: 2 K/UL (ref 1–4.8)
LYMPHOCYTES NFR BLD: 24.9 % (ref 18–48)
MAGNESIUM SERPL-MCNC: 1.9 MG/DL (ref 1.6–2.6)
MCH RBC QN AUTO: 29.8 PG (ref 27–31)
MCHC RBC AUTO-ENTMCNC: 33.6 G/DL (ref 32–36)
MCV RBC AUTO: 89 FL (ref 82–98)
MONOCYTES # BLD AUTO: 0.8 K/UL (ref 0.3–1)
MONOCYTES NFR BLD: 9.2 % (ref 4–15)
NEUTROPHILS # BLD AUTO: 4.9 K/UL (ref 1.8–7.7)
NEUTROPHILS NFR BLD: 60.8 % (ref 38–73)
NRBC BLD-RTO: 0 /100 WBC
PHOSPHATE SERPL-MCNC: 3.4 MG/DL (ref 2.7–4.5)
PLATELET # BLD AUTO: 243 K/UL (ref 150–450)
PMV BLD AUTO: 10 FL (ref 9.2–12.9)
POCT GLUCOSE: 142 MG/DL (ref 70–110)
POCT GLUCOSE: 169 MG/DL (ref 70–110)
POCT GLUCOSE: 186 MG/DL (ref 70–110)
POTASSIUM SERPL-SCNC: 4.2 MMOL/L (ref 3.5–5.1)
PROT SERPL-MCNC: 6.2 G/DL (ref 6–8.4)
RBC # BLD AUTO: 3.89 M/UL (ref 4–5.4)
SATURATED IRON: 12 % (ref 20–50)
SODIUM SERPL-SCNC: 139 MMOL/L (ref 136–145)
TOTAL IRON BINDING CAPACITY: 339 UG/DL (ref 250–450)
TRANSFERRIN SERPL-MCNC: 229 MG/DL (ref 200–375)
WBC # BLD AUTO: 8.14 K/UL (ref 3.9–12.7)

## 2024-08-30 PROCEDURE — 83735 ASSAY OF MAGNESIUM: CPT | Performed by: STUDENT IN AN ORGANIZED HEALTH CARE EDUCATION/TRAINING PROGRAM

## 2024-08-30 PROCEDURE — 36415 COLL VENOUS BLD VENIPUNCTURE: CPT

## 2024-08-30 PROCEDURE — 85025 COMPLETE CBC W/AUTO DIFF WBC: CPT | Performed by: STUDENT IN AN ORGANIZED HEALTH CARE EDUCATION/TRAINING PROGRAM

## 2024-08-30 PROCEDURE — 25000003 PHARM REV CODE 250

## 2024-08-30 PROCEDURE — 84100 ASSAY OF PHOSPHORUS: CPT | Performed by: STUDENT IN AN ORGANIZED HEALTH CARE EDUCATION/TRAINING PROGRAM

## 2024-08-30 PROCEDURE — 36415 COLL VENOUS BLD VENIPUNCTURE: CPT | Performed by: STUDENT IN AN ORGANIZED HEALTH CARE EDUCATION/TRAINING PROGRAM

## 2024-08-30 PROCEDURE — 80053 COMPREHEN METABOLIC PANEL: CPT | Performed by: STUDENT IN AN ORGANIZED HEALTH CARE EDUCATION/TRAINING PROGRAM

## 2024-08-30 PROCEDURE — 82728 ASSAY OF FERRITIN: CPT

## 2024-08-30 PROCEDURE — 25000003 PHARM REV CODE 250: Performed by: STUDENT IN AN ORGANIZED HEALTH CARE EDUCATION/TRAINING PROGRAM

## 2024-08-30 PROCEDURE — 83540 ASSAY OF IRON: CPT

## 2024-08-30 RX ORDER — BLOOD-GLUCOSE CONTROL, NORMAL
EACH MISCELLANEOUS
Qty: 100 EACH | Refills: 2 | Status: SHIPPED | OUTPATIENT
Start: 2024-08-30

## 2024-08-30 RX ORDER — PEN NEEDLE, DIABETIC 29 G X1/2"
1 NEEDLE, DISPOSABLE MISCELLANEOUS 4 TIMES DAILY
Qty: 120 EACH | Refills: 2 | Status: SHIPPED | OUTPATIENT
Start: 2024-08-30

## 2024-08-30 RX ORDER — INSULIN GLARGINE 100 [IU]/ML
22 INJECTION, SOLUTION SUBCUTANEOUS NIGHTLY
Qty: 15 ML | Refills: 3 | Status: SHIPPED | OUTPATIENT
Start: 2024-08-30 | End: 2025-08-30

## 2024-08-30 RX ORDER — INSULIN ASPART 100 [IU]/ML
7 INJECTION, SOLUTION INTRAVENOUS; SUBCUTANEOUS
Qty: 15 ML | Refills: 2 | Status: SHIPPED | OUTPATIENT
Start: 2024-08-30 | End: 2024-11-28

## 2024-08-30 RX ORDER — DEXTROSE 4 G
TABLET,CHEWABLE ORAL
Qty: 1 EACH | Refills: 0 | Status: SHIPPED | OUTPATIENT
Start: 2024-08-30

## 2024-08-30 RX ADMIN — INSULIN ASPART 7 UNITS: 100 INJECTION, SOLUTION INTRAVENOUS; SUBCUTANEOUS at 11:08

## 2024-08-30 RX ADMIN — CETIRIZINE HYDROCHLORIDE 5 MG: 5 TABLET, FILM COATED ORAL at 07:08

## 2024-08-30 RX ADMIN — APIXABAN 10 MG: 5 TABLET, FILM COATED ORAL at 09:08

## 2024-08-30 RX ADMIN — INSULIN ASPART 7 UNITS: 100 INJECTION, SOLUTION INTRAVENOUS; SUBCUTANEOUS at 07:08

## 2024-08-30 RX ADMIN — INSULIN ASPART 2 UNITS: 100 INJECTION, SOLUTION INTRAVENOUS; SUBCUTANEOUS at 06:08

## 2024-08-30 RX ADMIN — INSULIN ASPART 2 UNITS: 100 INJECTION, SOLUTION INTRAVENOUS; SUBCUTANEOUS at 11:08

## 2024-08-30 NOTE — PLAN OF CARE
Pt is set to d/c home later today. Pt will have her family help transport the pt home at d/c. Pt has f/u appts listed on AVS. Rounds completed on pt. All questions addressed. Bedside nurse to discuss d/c medications. Discussed importance to attend all f/u appts and take medications as prescribed. Verbalized understanding. Case Management to sign off.     Alejandro Milan MSLINDA  [phone]    Future Appointments   Date Time Provider Department Center   9/11/2024 11:30 AM Nieves Block MD [facility]  [facility]   9/23/2024  9:00 AM Sanchez Parekh MD [facility] HEM ONC [facility]        08/30/24 1051   Post-Acute Status   Post-Acute Authorization Placement   Coverage PHN   Discharge Delays None known at this time   Discharge Plan   Discharge Plan A Home with family

## 2024-08-30 NOTE — PROGRESS NOTES
Discharge orders noted. Additional clinical references attached. Patient's discharge instructions given by bedside RN and reviewed via this VN.  Education provided on new and previous medications, diagnosis, and follow-up appointments.  New medications delivered by pharmacy. Patient verbalized understanding and teach back method was used. Patient's ride/transportation home at bedside. All questions answered. Transport to [facility] requested. Floor nurse notified.                08/30/24 1613    Notification    Notified Of Discharge Status   Admission   Communication Issues? None   Shift   Virtual Nurse - Rounding Complete   Virtual Nurse - Patient Verbalized Approval Of Camera Use   Safety/Activity   Patient Rounds bed in low position;call light in patient/parent reach;clutter free environment maintained;visualized patient   Safety Promotion/Fall Prevention side rails raised x 2   Activity Management Up in chair - L3

## 2024-08-30 NOTE — PLAN OF CARE
AAOx4; POC reviewed & verbalizes understanding.  Admit DX: DKA  Afebrile. No acute events on shift. No deficits noted  IV access & IVF: PIV saline locked & flushed  BM: 8/29/24  : WNL  Appetite: excellent  Bed alarm set; fall precautions maintained.   Bed locked in lowest position, side rails up x2, call light within reach, environment clear. Encouraged pt to call nurse with any concerns.  Safety measures maintained    PIV removed, D/C paperwork given & will be D/C'd to home  Diabetic education given

## 2024-08-30 NOTE — DISCHARGE SUMMARY
[facility] Hospital Medicine Discharge Summary    Primary Team: [facility] Hospitalist Team A  Attending Physician: Yvette Luo MD  Resident: Westley Lee DO  Intern: Lew David MD    Date of Admit: 8/27/2024  Date of Discharge: 8/30/2024    Discharge to: Home  Condition: Stable    Discharge Diagnoses     Patient Active Problem List   Diagnosis    Other pulmonary embolism without acute cor pulmonale    Acute deep vein thrombosis (DVT) of popliteal vein of left lower extremity    Type 2 diabetes mellitus with hyperglycemia, without long-term current use of insulin    Normocytic anemia       Consultants and Procedures     Consultants:  Consults (From admission, onward)          Status Ordering Provider     Inpatient consult to Diabetes educator  Once        Provider:  (Not yet assigned)    Acknowledged YVETTE LUO     Inpatient consult to Diabetes educator  Once        Provider:  (Not yet assigned)    Acknowledged WESTLEY LEE     Inpatient consult to Cardiology-U  Once        Provider:  (Not yet assigned)    Completed WESTLEY LEE             Procedures:   N/a    Imaging:  US Upper Extremity Veins Bilateral   Final Result      No thrombus in the central veins of the right or left upper extremity.         Electronically signed by: Deny Piedra   Date:    08/28/2024   Time:    13:57      US Lower Extremity Veins Bilateral   Final Result      Nonocclusive thrombus is suggested in the left popliteal vein.  Lesser saphenous vein appears thrombosed as well.         Electronically signed by: Dawood Salazar   Date:    08/27/2024   Time:    17:48      CTA Chest Non-Coronary (PE Studies)   Final Result   Abnormal      1. Multiple bilateral pulmonary artery thromboembolism with no saddle embolus..   2. Mild right ventricular strain with RV LV ratio 1.1.   3. Findings communicated with Dr. Jada yLnch in the emergency department via [employer] secure chat with confirmation of receipt.   4. This report was flagged  in Epic as abnormal.         Electronically signed by: Dawood Salazar   Date:    08/27/2024   Time:    17:16      X-Ray Chest 1 View   Final Result      No acute process.         Electronically signed by: Robert Carreno MD   Date:    08/27/2024   Time:    14:25           Brief History of Present Illness      Davida Plasencia is a 59 y.o. female with PMH of prediabetes who presented to [facility] on 8/27/2024 for acute-onset SOB. Patient was admitted to [facility] for DKA, found to have bilateral pulmonary embolisms.     She presented with acute-onset SOB while walking, associated with palpitations, and left calf pain for one day. Found to be hypoxic, tachypneic, tachycardic in ED. Also, had labs consistent with mild DKA - HAGMA, hyperglycemia. She underwent CTPE revealing bilateral submassive pulmonary embolisms, not a saddle PE, with mild right heart strain and trop elevation. She was hemodynamically stable throughout stay. Cards consulted who recommended medical management. She eventually was weaned off O2, ambulating without SOB. For her DKA, she received subcutaneous insulin and fluids with resolution. Her A1C is >14, insulin regimen titrated without issue.    For the full HPI please refer to the History & Physical from this admission.    Hospital Course By Problem with Pertinent Findings     #Acute hypoxic respiratory failure, resolved  #Submassive bilateral pulmonary embolisms  #DVTs, left lesser saphenous vein and popliteal vein  Pt presented with story of sudden-onset SOB while walking to her neighbors house. She has associated palpitations and lightheadedness. New left calf pain for 1 day. Denies CP. Afebrile. Non-smoker, no significant PMH, no history of recent travel.  - In ED - tachycardic to 117, hypoxic to 86%, tachypneic to 27  - VBG in ED - 7.37/32.5/44.3/18.8  - CXR with no acute process, no focal consolidations  - Afebrile, normotensive, lactate WNL  - Wells score of 4.5  - BNP 29, trop 0.105->0.093  -  CTPE - multiple bilateral pulmonary thromboembolisms with mild right ventricular strain (LV:RV Ratio 1:1)  - DVT US - Nonocclusive thrombus is suggested in the left popliteal vein. Lesser saphenous vein appears thrombosed as well.   - Consulted cardiology for consideration of catheter-guided therapy  - Cards recommended no intervention - continued medical management  - TTE - moderate right ventricular enlargement, EF 55-60%  - She has an intermediate risk profile  - Was on Heparin drip x 2 days - transitioned to Eliquis  - Breathing comfortably on room air  Plan:   - Will need additional 4 days of Eliquis 10mg BID before transitioning to Eliquis 5 mg BID indefinitely with close follow-up with cardiology outpatient  - No current identifiable provocation of her PE, she may require lifetime AC     #Diabetic Ketoacidosis, resolved  #HAGMA, resolved  #T2DM, uncontrolled  She has a remote history of pre-diabetes, previously on Metformin. She had no recent A1C on file and had never used insulin.  - In ED, found to have glucose of 586, BHB 2.5, urine ketones, and an anion gap of 17. VBG with pH 7.37  - She was given subcutaneous insulin with IVF  - Gap close with resolution of metabolic acidosis  - A1C on admission >14  - Insulin titrated with POCT glucose 180 prior to discharge  Plan:  - Started on 10u Lantus nightly with SSI - adjusted to 20u Lantus nightly with 7u aspart prandial with better control  - Will be sent home with 22u Lantus daily and 7u Aspart with meals  - She will need outpatient PCP follow-up for T2DM management     #NSTEMI, type 2  Troponin elevation of 0.105 with no reported chest pain. EKG without evidence of ischemic changes. Found to have submassive PE with evidence of mild right heart strain.  - Troponin down-trended     #Normocytic Anemia  Hgb 11.5-11.6 from 14.5 on admission. Notably, platelets and WBC also decreased following LR bolus + infusion.  - Initially though to be dilutional in setting of  "IVF  - Continued to be around 11.6  - Normocytic, normal RDW  - Iron studies with ferritin 296, not likely CHRISTINA     #Transaminitis, improving  Mild elevation in AST/ALT and ALP on day 2  - Improved with no intervention     #Hypomagnesemia  Mag of 1.4 -> 1.6 - > 1.9  Replenished as needed    Discharge Medications        Medication List        START taking these medications      apixaban 5 mg Tab  Commonly known as: ELIQUIS  Take 2 tablets (10 mg total) by mouth 2 (two) times daily for 4 days, THEN 1 tablet (5 mg total) 2 (two) times daily.  Start taking on: August 30, 2024     blood sugar diagnostic Strp  To check BG 3 times daily, to use with insurance preferred meter     blood-glucose meter Misc  To check BG 3 times daily     insulin aspart U-100 100 unit/mL (3 mL) Inpn pen  Commonly known as: NovoLOG Flexpen U-100 Insulin  Inject 7 Units into the skin 3 (three) times daily with meals.     lancets 30 gauge Misc  To check BG 3 times daily, to use with insurance preferred meter     pen needle, diabetic 31 gauge x 1/4" Ndle  1 each by Misc.(Non-Drug; Combo Route) route 4 (four) times daily.            CHANGE how you take these medications      LANTUS SOLOSTAR U-100 INSULIN 100 unit/mL (3 mL) Inpn pen  Generic drug: insulin glargine U-100 (Lantus)  Inject 22 Units into the skin every evening.  What changed: how much to take            CONTINUE taking these medications      ALPRAZolam 0.5 MG tablet  Commonly known as: XANAX     cetirizine 10 MG tablet  Commonly known as: ZYRTEC            STOP taking these medications      ibuprofen 200 MG tablet  Commonly known as: ADVIL,MOTRIN               Where to Get Your Medications        These medications were sent to [facility]  [address], [place] [address]      Hours: Mon-Fri, 8a-5:30p Phone: [phone]   apixaban 5 mg Tab  blood sugar diagnostic Strp  blood-glucose meter Misc  insulin aspart U-100 100 unit/mL (3 mL) Inpn pen  lancets 30 gauge " "[facility]  LANTUS SOLOSTAR U-100 INSULIN 100 unit/mL (3 mL) Inpn pen  pen needle, diabetic 31 gauge x 1/4" Ndle         Discharge Information:     Diet:  Diabetic    Physical Activity:  As tolerated             Instructions:  1. Take all medications as prescribed  2. Keep all follow-up appointments  3. Return to the hospital or call your primary care physicians if any worsening symptoms such as fever, chest pain, shortness of breath, return of symptoms, or any other concerns.    Follow-Up Appointments:  Future Appointments   Date Time Provider Department Center   9/11/2024 11:30 AM Nieves Block MD [facility]  [facility]   9/23/2024  9:00 AM Sanchez Parekh MD [facility] HEM ONC [facility]   9/23/2024  1:00 PM Cierra Ruiz RD [facility] MADELEINE David MD  [facility] Internal Medicine PGY-I     "

## 2024-08-30 NOTE — PLAN OF CARE
Problem: Adult Inpatient Plan of Care  Goal: Plan of Care Review  8/30/2024 1615 by Kenia Gresham, RN  Outcome: Met  8/30/2024 0902 by Kenia Gresham RN  Outcome: Progressing  Goal: Patient-Specific Goal (Individualized)  Outcome: Met  Goal: Absence of Hospital-Acquired Illness or Injury  Outcome: Met  Goal: Optimal Comfort and Wellbeing  Outcome: Met  Goal: Readiness for Transition of Care  Outcome: Met     Problem: Diabetes Comorbidity  Goal: Blood Glucose Level Within Targeted Range  Outcome: Met     Problem: Fall Injury Risk  Goal: Absence of Fall and Fall-Related Injury  Outcome: Met     Problem: Glycemic Control Impaired  Goal: Blood Glucose Level Within Targeted Range  Outcome: Met  Goal: Minimize Risk of Hypoglycemia  Outcome: Met     Problem: Dysrhythmia  Goal: Normalized Cardiac Rhythm  Outcome: Met     Problem: VTE (Venous Thromboembolism)  Goal: Tissue Perfusion  Outcome: Met  Goal: Right Ventricular Function  Outcome: Met     Problem: Bariatric Environmental Safety  Goal: Safety Maintained with Care  Outcome: Met     Problem: Gas Exchange Impaired  Goal: Optimal Gas Exchange  Outcome: Met

## 2024-08-30 NOTE — MEDICAL/APP STUDENT
[facility] Medicine Progress Note    Primary Team: [facility] Hospitalist Team A  Attending Physician: Yvette Vargas MD  Resident: Shelli Jordan DO  Intern: Lew David MD     Subjective:      Ms. Plasencia has no complaints overnight. She slept well last night. Off of oxygen this morning. Denies SOB and chest pain. She is tolerating her medication changes well.      Objective:     Last 24 Hour Vital Signs:  BP  Min: 115/77  Max: 133/73  Temp  Av.2 °F (36.8 °C)  Min: 97.4 °F (36.3 °C)  Max: 98.8 °F (37.1 °C)  Pulse  Av.4  Min: 82  Max: 92  Resp  Av.7  Min: 18  Max: 20  SpO2  Av %  Min: 93 %  Max: 100 %  Weight  Av.5 kg (206 lb 2.1 oz)  Min: 93.5 kg (206 lb 2.1 oz)  Max: 93.5 kg (206 lb 2.1 oz)  I/O last 3 completed shifts:  In: 580 [P.O.:580]  Out: -     Physical Examination:  General: not in acute distress, not toxic-appearing  Cardiovascular: normal rate and rhythm, no murmur, no gallop   Pulmonary: normal breath sounds   Musculoskeletal: no tenderness or edema   Skin: dry and warm     Laboratory:  Laboratory Data Reviewed: yes  Pertinent Findings:  Recent Labs   Lab 24  0329 24  1020 24  1603 24  0229 24  0301   WBC 9.72  --   --  9.65 8.14   HGB 11.5*  --   --  11.6* 11.6*   HCT 33.1*  --   --  33.7* 34.5*     --   --  203 243     137   < > 133* 136 139   K 4.0  4.0   < > 3.9 3.8 4.2     107   < > 103 102 105   CREATININE 0.8  0.8   < > 0.8 0.8 0.8   BUN 15  15   < > 17 14 11   CO2 20*  20*   < > 19* 20* 23   ALT 17  --   --  67* 53*   AST 17  --   --  65* 30    < > = values in this interval not displayed.   Magnesium 1.9 (1.6 )   Phosphate 3.4 (3.1 )     Microbiology Data Reviewed: yes  Pertinent Findings:  Microbiology Results (last 7 days)       Procedure Component Value Units Date/Time    Influenza A & B by Molecular [[id number]] Collected: 24 1242    Order Status: Completed Specimen: Nasopharyngeal Swab Updated:  08/27/24 1314     Influenza A, Molecular Negative     Influenza B, Molecular Negative     Flu A & B Source Nasal swab          Other Results:  Radiology Data Reviewed: yes  Pertinent Findings:  Imaging Results              US Lower Extremity Veins Bilateral (Final result)  Result time 08/27/24 17:48:59      Final result by Dawood Salazar MD (08/27/24 17:48:59)                   Impression:      Nonocclusive thrombus is suggested in the left popliteal vein.  Lesser saphenous vein appears thrombosed as well.      Electronically signed by: Dawood Salazar  Date:    08/27/2024  Time:    17:48               Narrative:    EXAMINATION:  US LOWER EXTREMITY VEINS BILATERAL    CLINICAL HISTORY:  Concern for DVT, left lower extremity pain and swelling; Chest pain, unspecified    TECHNIQUE:  Duplex and color flow Doppler and dynamic compression was performed of the bilateral lower extremity veins was performed.    COMPARISON:  None    FINDINGS:  Right thigh veins: The common femoral, femoral, popliteal, upper greater saphenous, and deep femoral veins are patent and free of thrombus. The veins are normally compressible and have normal phasic flow and augmentation response.    Right calf veins: The visualized calf veins are patent.    Left thigh veins: Nonocclusive thrombus is suggested in the left popliteal vein.  Lesser saphenous vein appears thrombosed as well.  The common femoral, femoral upper greater saphenous, and deep femoral veins are patent and free of thrombus. The veins are normally compressible and have normal phasic flow and augmentation response.    Left calf veins: The visualized calf veins are patent.    Miscellaneous: None                                        CTA Chest Non-Coronary (PE Studies) (Final result)  Result time 08/27/24 17:16:34      Final result by Dawood Salazar MD (08/27/24 17:16:34)                   Impression:      1. Multiple bilateral pulmonary artery thromboembolism with no  saddle embolus..  2. Mild right ventricular strain with RV LV ratio 1.1.  3. Findings communicated with Dr. Jada Lynch in the emergency department via [email or web address] secure chat with confirmation of receipt.  4. This report was flagged in Epic as abnormal.      Electronically signed by: Dawood Salazar  Date:    08/27/2024  Time:    17:16               Narrative:    EXAMINATION:  CTA CHEST NON CORONARY (PE STUDIES)    CLINICAL HISTORY:  Pulmonary embolism (PE) suspected, high prob;    TECHNIQUE:  Following the intravenous administration of 75 cc of Omnipaque 350 contrast material, 1.25 mm contiguous axial images were acquired through the chest utilizing the CT pulmonary angiogram protocol.  2-D coronal and sagittal reconstructed images of the chest and sagittal oblique MIP images of the pulmonary arterial system were generated from the source data.    COMPARISON:  None.    FINDINGS:  Pulmonary arterial system: This is a good quality examination for the evaluation of pulmonary thromboembolism with good opacification of the pulmonary arteries to the level of the segmental branches of the pulmonary arterial system.  Multiple bilateral central pulmonary artery thromboembolism in the interlobular pulmonary artery on the right in the interlobular pulmonary artery on the left to the lower lobes with upper lobe involvement primarily on the left..  No large saddle pulmonary embolism, enlargement of the main pulmonary artery, and mild right ventricular strain with RV : LV ratio 1.1.    Aorta and heart: The heart is normal in size.  No pericardial fluid.  No thoracic aortic aneurysm.  No thoracic aortic dissection.    Airways: Unremarkable.    Lymph nodes: No pathologically enlarged lymph nodes in the chest or axilla.    Lungs: The lungs are clear with no evidence of airspace consolidation, mass, or bronchiectasis.  No emphysematous lung architecture.    Pleura: No significant volume of pleural fluid or pneumothorax.  No pleural based  masses.    Visualized upper abdominal soft tissues: Hepatic steatosis.    Bones: There is degenerative change of the thoracic spine.                                       X-Ray Chest 1 View (Final result)  Result time 08/27/24 14:25:59      Final result by Robert Carreno MD (08/27/24 14:25:59)                   Impression:      No acute process.      Electronically signed by: Robert Carreno MD  Date:    08/27/2024  Time:    14:25               Narrative:    EXAMINATION:  XR CHEST 1 VIEW    CLINICAL HISTORY:  Shortness of breath    TECHNIQUE:  Single frontal view of the chest was performed.    COMPARISON:  None    FINDINGS:  Monitoring EKG leads are present.  The trachea is unremarkable.  The cardiomediastinal silhouette is within normal limits.  The hilar structures are unremarkable.  There is no evidence of free air beneath the hemidiaphragms.  There are no pleural effusions.  There is no evidence of a pneumothorax.  There is no evidence of pneumomediastinum.  No airspace opacity is present.  There are degenerative changes in the osseous structures.                                    Current Medications:       Scheduled:   apixaban  10 mg Oral BID    insulin aspart U-100  7 Units Subcutaneous TIDWM    insulin glargine U-100  20 Units Subcutaneous QHS        PRN:    Current Facility-Administered Medications:     acetaminophen, 650 mg, Oral, Q6H PRN    cetirizine, 5 mg, Oral, Nightly PRN    dextrose 10%, 12.5 g, Intravenous, PRN    dextrose 10%, 25 g, Intravenous, PRN    glucagon (human recombinant), 1 mg, Intramuscular, PRN    glucose, 16 g, Oral, PRN    glucose, 24 g, Oral, PRN    insulin aspart U-100, 0-10 Units, Subcutaneous, QID (AC + HS) PRN    melatonin, 6 mg, Oral, Nightly PRN    pneumoc 20-elizabeth conj-dip cr(PF), 0.5 mL, Intramuscular, vaccine x 1 dose    sodium chloride 0.9%, 10 mL, Intravenous, PRN    Antibiotics and Day Number of Therapy:  None     Lines and Day Number of Therapy:  L and R PIV placed  8/27    Assessment:     Davida Plasencia is a 59 y.o.female with with PMH of prediabetes who presented to [facility] on 8/27/2024 for acute-onset SOB.  Patient was admitted to [facility] for DKA, found to have bilateral pulmonary embolisms. Cardiology consulted and agrees patient stable to discharge on AC. Close f/u with OP cardiology and endocrinology next week.     Plan:     Acute hypoxic respiratory failure  Submassive bilateral pulmonary embolisms  DVTs, left lesser saphenous vein and popliteal vein  Pt presented with story of sudden-onset SOB while walking to her neighbors house. She has associated palpitations and lightheadedness. New left calf pain for 1 day. Denies CP. Afebrile. Non-smoker, no significant PMH, no history of recent travel.  - In ED - tachycardic to 117, hypoxic to 86%, tachypneic to 27  - VBG in ED - 7.37/32.5/44.3/18.8  - CXR with no acute process, no focal consolidations  - Afebrile, normotensive, lactate WNL in ED  - Wells score of 4.5  - BNP 29, trop 0.105  - CTPE - multiple bilateral pulmonary thromboembolisms with mild right ventricular strain (LV:RV Ratio 1:1)  - DVT US - Nonocclusive thrombus is suggested in the left popliteal vein. Lesser saphenous vein appears thrombosed as well.   - Consulted cardiology for consideration of catheter-guided therapy  - Cards recommends no intervention at this time- continued medical management  - TTE - moderate right ventricular enlargement, EF 55-60%  - She has an intermediate risk profile  Plan:  - Has been on Heparin drip x 2 days  - Transitioning to Eliquis 10 mg BID x 7 days, then Eliquis 5 mg BID indefinitely with close follow-up with cardiology outpatient  - No current identifiable provocation of her PE, she may require lifetime AC     Diabetic Ketoacidosis  HAGMA  T2DM, uncontrolled  She has a remote history of pre-diabetes, previously on Metformin. She had no recent A1C on file and has never used insulin.  - In ED, found to have glucose of 586,  BHB 2.5, urine ketones, and an anion gap of 17  - VBG with pH 7.37  - She was given 9 units subcutaneously in ED with 2L NS  - On repeat labs, gap closed at 11  - A1C on admission >14  - Gap then elevated again to 18 but subsequently closed at 10 after 10u lantus  - Bicarb 20, gap remains closed  - Glucose downtrending to 223 today  Plan:  - Admitted to floor with resolution following initial IVF/insulin  - Continuing q4 BMP to monitor  - Started on 10u Lantus nightly with SSI  -Increase to 20u Lantus nightly and 7u aspart with meals   - She will need outpatient follow-up for T2DM management     NSTEMI, type 2  Troponin elevation of 0.105 with no reported chest pain. EKG without evidence of ischemic changes. Found to have submassive PE with evidence of mild right heart strain.  - Repeat troponin plateau at 0.105     Normocytic Anemia  Hgb 11.5 from 14.5. Notably, platelets and WBC also decreased following LR bolus + infusion.  - May be dilutional in setting of IVF  - 11.6 today, will continue to monitor  - If still anemic following discontinuation of fluids, will obtain iron studies  -Anemia persist following discontinuation of fluids. Iron studies ordered, follow results      Transaminitis  Mild elevation on hospital day 2 of AST/ALT to 65/67 from 17/17.   from 116, was initially elevated at 204  Trending down AST/ALT 30/53,      Hypomagnesemia  Mag of 1.5 on admission   Replenishing IV  Trending down 1.9 today     Diet: Diabetic  Code: Full  Dispo: Likely discharge today     Dilan Yeboah  [facility] HospitalSC-[facility] Medical Student, 3rd Year     [facility] Medicine Hospitalist Pager numbers:   [facility] Hospitalist Medicine Team A (Cathy/Sam): [phone]  [facility] Hospitalist Medicine Team B (Chandana/Evelyn):  [phone]

## 2024-08-30 NOTE — NURSING
Diabetic education and booklet given. Patient educated on how to administer insulin. Patient able to teach back correctly. Patient states she received a glucometer from pharmacy and knows how to use it already.

## 2024-08-30 NOTE — PROGRESS NOTES
[facility] Medicine Progress Note    Primary Team: \[facility]\"" Hospitalist Team A  Attending Physician: Yvette Vargas MD  Resident: Shelli Jordan DO  Intern: Lew David MD    Date of Admit: 2024     Chief Complaint:   Chief Complaint   Patient presents with    Shortness of Breath     Patient presents to the ED complaining of generalized weakness, shortness of breath when walking, and congestion x3-4 days. Patient sent from MD office for evaluation. CBG over 500 in triage.       Subjective/Interval Events:      Ms. Plasencia is resting in bed comfortably with no new complaints. States she has been able to ambulate around her room now with no SOB. Breathing comfortably on room air. She has no pain and has been eating/drinking without issue.    Objective    Objective   Last 24 Hour Vital Signs:  BP  Min: 115/77  Max: 133/73  Temp  Av.2 °F (36.8 °C)  Min: 97.4 °F (36.3 °C)  Max: 98.8 °F (37.1 °C)  Pulse  Av.4  Min: 82  Max: 92  Resp  Av.7  Min: 18  Max: 20  SpO2  Av %  Min: 93 %  Max: 100 %  Weight  Av.5 kg (206 lb 2.1 oz)  Min: 93.5 kg (206 lb 2.1 oz)  Max: 93.5 kg (206 lb 2.1 oz)    Intake/Output Summary (Last 24 hours) at 2024 0710  Last data filed at 2024 0345  Gross per 24 hour   Intake 460 ml   Output --   Net 460 ml       Physical Examination:  Physical Exam  Constitutional:       General: She is not in acute distress.     Appearance: She is not toxic-appearing.   HENT:      Mouth/Throat:      Pharynx: Oropharynx is clear.   Eyes:      General: No visual field deficit.     Extraocular Movements: Extraocular movements intact.      Conjunctiva/sclera: Conjunctivae normal.   Cardiovascular:      Rate and Rhythm: Normal rate and regular rhythm.      Pulses: Normal pulses.      Heart sounds: No murmur heard.     No gallop.   Pulmonary:      Effort: Pulmonary effort is normal. No respiratory distress.      Breath sounds: Normal breath sounds. No wheezing.   Abdominal:       General: Abdomen is flat. There is no distension.      Palpations: Abdomen is soft.      Tenderness: There is no abdominal tenderness. There is no guarding.   Musculoskeletal:         General: No tenderness.      Left lower leg: Edema (slight increase in diameter compared to right) present.   Skin:     General: Skin is warm and dry.   Neurological:      General: No focal deficit present.      Mental Status: She is alert and oriented to person, place, and time. Mental status is at baseline.      Cranial Nerves: No dysarthria or facial asymmetry.   Psychiatric:         Mood and Affect: Mood normal.         Behavior: Behavior normal.          Laboratory:  Recent Labs   Lab 08/28/24  0329 08/28/24  1020 08/28/24  1603 08/29/24  0229 08/30/24  0301   WBC 9.72  --   --  9.65 8.14   HGB 11.5*  --   --  11.6* 11.6*   HCT 33.1*  --   --  33.7* 34.5*     --   --  203 243   MCV 86  --   --  88 89   RDW 12.1  --   --  12.0 12.3     137   < > 133* 136 139   K 4.0  4.0   < > 3.9 3.8 4.2     107   < > 103 102 105   CO2 20*  20*   < > 19* 20* 23   BUN 15  15   < > 17 14 11   CREATININE 0.8  0.8   < > 0.8 0.8 0.8   *  289*   < > 270* 223* 180*   PROT 5.7*  --   --  6.0 6.2   ALBUMIN 2.9*  --   --  3.1* 3.1*   BILITOT 0.4  --   --  0.4 0.4   AST 17  --   --  65* 30   ALKPHOS 116  --   --  136* 132   ALT 17  --   --  67* 53*    < > = values in this interval not displayed.       Microbiology:  Microbiology Results (last 7 days)       Procedure Component Value Units Date/Time    Influenza A & B by Molecular [[id number]] Collected: 08/27/24 1242    Order Status: Completed Specimen: Nasopharyngeal Swab Updated: 08/27/24 1314     Influenza A, Molecular Negative     Influenza B, Molecular Negative     Flu A & B Source Nasal swab             Imaging:  US Upper Extremity Veins Bilateral   Final Result      No thrombus in the central veins of the right or left upper extremity.         Electronically signed  by: Deny Piedra   Date:    08/28/2024   Time:    13:57      US Lower Extremity Veins Bilateral   Final Result      Nonocclusive thrombus is suggested in the left popliteal vein.  Lesser saphenous vein appears thrombosed as well.         Electronically signed by: Dawood Salazar   Date:    08/27/2024   Time:    17:48      CTA Chest Non-Coronary (PE Studies)   Final Result   Abnormal      1. Multiple bilateral pulmonary artery thromboembolism with no saddle embolus..   2. Mild right ventricular strain with RV LV ratio 1.1.   3. Findings communicated with Dr. Jada Lynch in the emergency department via [employer] secure chat with confirmation of receipt.   4. This report was flagged in Epic as abnormal.         Electronically signed by: Dawood Salazar   Date:    08/27/2024   Time:    17:16      X-Ray Chest 1 View   Final Result      No acute process.         Electronically signed by: Robert Carreno MD   Date:    08/27/2024   Time:    14:25           Current Medications:     Scheduled:   apixaban  10 mg Oral BID    insulin aspart U-100  7 Units Subcutaneous TIDWM    insulin glargine U-100  20 Units Subcutaneous QHS     PRN:    Current Facility-Administered Medications:     acetaminophen, 650 mg, Oral, Q6H PRN    cetirizine, 5 mg, Oral, Nightly PRN    dextrose 10%, 12.5 g, Intravenous, PRN    dextrose 10%, 25 g, Intravenous, PRN    glucagon (human recombinant), 1 mg, Intramuscular, PRN    glucose, 16 g, Oral, PRN    glucose, 24 g, Oral, PRN    insulin aspart U-100, 0-10 Units, Subcutaneous, QID (AC + HS) PRN    melatonin, 6 mg, Oral, Nightly PRN    pneumoc 20-elizabeth conj-dip cr(PF), 0.5 mL, Intramuscular, vaccine x 1 dose    sodium chloride 0.9%, 10 mL, Intravenous, PRN       Assessment:     Davida Plasencia is a 59 y.o. female with PMH of prediabetes who presented to [facility] on 8/27/2024 for acute-onset SOB.  Patient was admitted to [facility] for DKA, found to have bilateral pulmonary embolisms.     Plan:     #Acute hypoxic  respiratory failure, resolved  #Submassive bilateral pulmonary embolisms  #DVTs, left lesser saphenous vein and popliteal vein  Pt presented with story of sudden-onset SOB while walking to her neighbors house. She has associated palpitations and lightheadedness. New left calf pain for 1 day. Denies CP. Afebrile. Non-smoker, no significant PMH, no history of recent travel.  - In ED - tachycardic to 117, hypoxic to 86%, tachypneic to 27  - VBG in ED - 7.37/32.5/44.3/18.8  - CXR with no acute process, no focal consolidations  - Afebrile, normotensive, lactate WNL in ED  - Wells score of 4.5  - BNP 29, trop 0.105->0.093  - CTPE - multiple bilateral pulmonary thromboembolisms with mild right ventricular strain (LV:RV Ratio 1:1)  - DVT US - Nonocclusive thrombus is suggested in the left popliteal vein. Lesser saphenous vein appears thrombosed as well.   - Consulted cardiology for consideration of catheter-guided therapy  - Cards recommends no intervention at this time- continued medical management  - TTE - moderate right ventricular enlargement, EF 55-60%  - She has an intermediate risk profile  - Was on Heparin drip x 2 days  - She is breathing comfortably on room air  Plan:  - Transitioned to Eliquis 10 mg BID yesterday   - Will need additional 4 days of Eliquis 10mg BID before transitioning to Eliquis 5 mg BID indefinitely with close follow-up with cardiology outpatient  - No current identifiable provocation of her PE, she may require lifetime AC     #Diabetic Ketoacidosis, resolved  #HAGMA, resolved  #T2DM, uncontrolled  She has a remote history of pre-diabetes, previously on Metformin. She had no recent A1C on file and has never used insulin.  - In ED, found to have glucose of 586, BHB 2.5, urine ketones, and an anion gap of 17  - VBG with pH 7.37  - She was given 9 units subcutaneously in ED with 2L NS  - On repeat labs, gap closed at 11  - A1C on admission >14  - Gap then elevated again to 18 but subsequently  closed at 10 after 10u lantus  - Bicarb 23, gap remains closed  - Glucose downtrending to 180 today after insulin adjustment  Plan:  - Admitted to floor with resolution following initial IVF/insulin  - Started on 10u Lantus nightly with SSI - adjusted to 20u Lantus nightly with 7u aspart prandial with better control  - She will need outpatient follow-up for T2DM management     #NSTEMI, type 2  Troponin elevation of 0.105 with no reported chest pain. EKG without evidence of ischemic changes. Found to have submassive PE with evidence of mild right heart strain.  - Repeat troponin plateau at 0.105     #Normocytic Anemia  Hgb 11.5-11.6 from 14.5 on admission. Notably, platelets and WBC also decreased following LR bolus + infusion.  - Initially though to be dilutional in setting of IVF  - Continues to be around 11.6  - Normocytic, normal RDW  - Will obtain iron studies    #Transaminitis, improving  Mild elevation in AST/ALT on day 2 to 65/67 from 17/17   from 116, was initially elevated at 204  - Trending down today, with ALT mildly elevated at 53    #Hypomagnesemia  Mag of 1.4 -> 1.6 - > 1.9  Replenishing as needed    Diet: Diabetic  Code: Full  Dispo: Likely discharge soon    Raymond David MD   [facility] PGY-1  [facility] Team A    [facility] Hospitalist Pager numbers:   [facility] Hospitalist Medicine Team A (Cathy/Sam): [phone]  [facility] Hospitalist Medicine Team B (Chandana/Evelyn):  [phone]

## 2024-09-23 ENCOUNTER — LAB VISIT (OUTPATIENT)
Dept: LAB | Facility: HOSPITAL | Age: 59
End: 2024-09-23
Attending: INTERNAL MEDICINE
Payer: COMMERCIAL

## 2024-09-23 ENCOUNTER — OFFICE VISIT (OUTPATIENT)
Dept: HEMATOLOGY/ONCOLOGY | Facility: CLINIC | Age: 59
End: 2024-09-23
Payer: COMMERCIAL

## 2024-09-23 VITALS
SYSTOLIC BLOOD PRESSURE: 117 MMHG | BODY MASS INDEX: 40.13 KG/M2 | OXYGEN SATURATION: 98 % | RESPIRATION RATE: 16 BRPM | DIASTOLIC BLOOD PRESSURE: 65 MMHG | HEIGHT: 60 IN | WEIGHT: 204.38 LBS | HEART RATE: 86 BPM

## 2024-09-23 DIAGNOSIS — I82.432 ACUTE DEEP VEIN THROMBOSIS (DVT) OF POPLITEAL VEIN OF LEFT LOWER EXTREMITY: ICD-10-CM

## 2024-09-23 DIAGNOSIS — Z12.31 ENCOUNTER FOR SCREENING MAMMOGRAM FOR MALIGNANT NEOPLASM OF BREAST: ICD-10-CM

## 2024-09-23 DIAGNOSIS — I26.99 ACUTE PULMONARY EMBOLISM, UNSPECIFIED PULMONARY EMBOLISM TYPE, UNSPECIFIED WHETHER ACUTE COR PULMONALE PRESENT: Primary | ICD-10-CM

## 2024-09-23 DIAGNOSIS — E11.65 TYPE 2 DIABETES MELLITUS WITH HYPERGLYCEMIA, WITHOUT LONG-TERM CURRENT USE OF INSULIN: ICD-10-CM

## 2024-09-23 DIAGNOSIS — D64.9 NORMOCYTIC ANEMIA: ICD-10-CM

## 2024-09-23 DIAGNOSIS — I26.99 ACUTE PULMONARY EMBOLISM, UNSPECIFIED PULMONARY EMBOLISM TYPE, UNSPECIFIED WHETHER ACUTE COR PULMONALE PRESENT: ICD-10-CM

## 2024-09-23 LAB
AT III ACT/NOR PPP CHRO: 111 % (ref 83–118)
D DIMER PPP IA.FEU-MCNC: 1.02 MG/L FEU

## 2024-09-23 PROCEDURE — 86148 ANTI-PHOSPHOLIPID ANTIBODY: CPT | Performed by: INTERNAL MEDICINE

## 2024-09-23 PROCEDURE — 85302 CLOT INHIBIT PROT C ANTIGEN: CPT | Performed by: INTERNAL MEDICINE

## 2024-09-23 PROCEDURE — 99204 OFFICE O/P NEW MOD 45 MIN: CPT | Mod: S$GLB,,, | Performed by: INTERNAL MEDICINE

## 2024-09-23 PROCEDURE — 85379 FIBRIN DEGRADATION QUANT: CPT | Performed by: INTERNAL MEDICINE

## 2024-09-23 PROCEDURE — 3046F HEMOGLOBIN A1C LEVEL >9.0%: CPT | Mod: CPTII,S$GLB,, | Performed by: INTERNAL MEDICINE

## 2024-09-23 PROCEDURE — 86146 BETA-2 GLYCOPROTEIN ANTIBODY: CPT | Performed by: INTERNAL MEDICINE

## 2024-09-23 PROCEDURE — 86147 CARDIOLIPIN ANTIBODY EA IG: CPT | Performed by: INTERNAL MEDICINE

## 2024-09-23 PROCEDURE — 85306 CLOT INHIBIT PROT S FREE: CPT | Performed by: INTERNAL MEDICINE

## 2024-09-23 PROCEDURE — 3078F DIAST BP <80 MM HG: CPT | Mod: CPTII,S$GLB,, | Performed by: INTERNAL MEDICINE

## 2024-09-23 PROCEDURE — 3008F BODY MASS INDEX DOCD: CPT | Mod: CPTII,S$GLB,, | Performed by: INTERNAL MEDICINE

## 2024-09-23 PROCEDURE — 1111F DSCHRG MED/CURRENT MED MERGE: CPT | Mod: CPTII,S$GLB,, | Performed by: INTERNAL MEDICINE

## 2024-09-23 PROCEDURE — 85300 ANTITHROMBIN III ACTIVITY: CPT | Performed by: INTERNAL MEDICINE

## 2024-09-23 PROCEDURE — 1159F MED LIST DOCD IN RCRD: CPT | Mod: CPTII,S$GLB,, | Performed by: INTERNAL MEDICINE

## 2024-09-23 PROCEDURE — 85610 PROTHROMBIN TIME: CPT | Performed by: INTERNAL MEDICINE

## 2024-09-23 PROCEDURE — 3074F SYST BP LT 130 MM HG: CPT | Mod: CPTII,S$GLB,, | Performed by: INTERNAL MEDICINE

## 2024-09-23 PROCEDURE — 85613 RUSSELL VIPER VENOM DILUTED: CPT | Performed by: INTERNAL MEDICINE

## 2024-09-23 PROCEDURE — 85730 THROMBOPLASTIN TIME PARTIAL: CPT | Performed by: INTERNAL MEDICINE

## 2024-09-23 PROCEDURE — 36415 COLL VENOUS BLD VENIPUNCTURE: CPT | Performed by: INTERNAL MEDICINE

## 2024-09-23 PROCEDURE — 99999 PR PBB SHADOW E&M-EST. PATIENT-LVL IV: CPT | Mod: PBBFAC,,, | Performed by: INTERNAL MEDICINE

## 2024-09-23 NOTE — PROGRESS NOTES
PATIENT: Davida Plasencia  MRN: [id number]  DATE: 9/23/2024    Diagnosis:   1. Encounter for screening mammogram for malignant neoplasm of breast    2. Acute pulmonary embolism, unspecified pulmonary embolism type, unspecified whether acute cor pulmonale present    3. Acute deep vein thrombosis (DVT) of popliteal vein of left lower extremity        Chief Complaint: pulmonary embolism and Establish Care         Subjective:      History of Present Illness: Ms. Plasencia is a 59 y.o. female who presents for evaluation and management of PE.     60yo woman referred for evaluation of newly diagnosed PE, likely secondary to DKA. She does note her father had a DVT in his 70s related to a hospitalization. On 8/27/24 she presented to ED with acute SOB, and subsequently she was diagnosed with acute PE, started on anticoagulation. Presented to ED in DKA which is thought to be the provoking cause of thrombosis.     Notes that her exertional tolerance is back to baseline. W/R/T age-appropriate cancer screening, she notes that she had been get annual mammography up until covid but hasn't gotten screened since [facility]. Her last colonoscopy was before covid as well.       Past medical, surgical, family, and social histories have been reviewed and updated below.    Past Medical History: No past medical history on file.    Past Surgical History: No past surgical history on file.    Family History: No family history on file.    Social History:      Allergies:  Review of patient's allergies indicates:  No Known Allergies    Medications:  Current Outpatient Medications   Medication Sig Dispense Refill    ALPRAZolam (XANAX) 0.5 MG tablet Take 0.5 mg by mouth daily as needed.      apixaban (ELIQUIS) 5 mg Tab Take 2 tablets (10 mg total) by mouth 2 (two) times daily for 4 days, THEN 1 tablet (5 mg total) 2 (two) times daily. 196 tablet 0    blood sugar diagnostic Strp To check BG 3 times daily, to use with insurance preferred meter 100 each 2     "blood-glucose meter Misc To check BG 3 times daily 1 each 0    cetirizine (ZYRTEC) 10 MG tablet Take 10 mg by mouth once daily.      insulin aspart U-100 (NOVOLOG FLEXPEN U-100 INSULIN) 100 unit/mL (3 mL) InPn pen Inject 7 Units into the skin 3 (three) times daily with meals. 15 mL 2    insulin glargine U-100, Lantus, (LANTUS SOLOSTAR U-100 INSULIN) 100 unit/mL (3 mL) InPn pen Inject 22 Units into the skin every evening. 15 mL 3    lancets 30 gauge Misc To check BG 3 times daily, to use with insurance preferred meter 100 each 2    pen needle, diabetic 31 gauge x 1/4" Ndle 1 each by Misc.(Non-Drug; Combo Route) route 4 (four) times daily. 120 each 2     No current facility-administered medications for this visit.       Review of Systems  A comprehensive review of systems was performed; pertinent positives and negatives are noted in the HPI.        Objective:      Vitals:   Vitals:    09/23/24 0905   BP: 117/65   BP Location: Left arm   Patient Position: Sitting   BP Method: Medium (Automatic)   Pulse: 86   Resp: 16   SpO2: 98%   Weight: 92.7 kg (204 lb 5.9 oz)   Height: 5' (1.524 m)     BMI: Body mass index is 39.91 kg/m².    Physical Exam  Vitals and nursing note reviewed.   Constitutional:       General: She is not in acute distress.     Appearance: Normal appearance. She is not toxic-appearing.   HENT:      Head: Normocephalic and atraumatic.   Eyes:      General: No scleral icterus.     Conjunctiva/sclera: Conjunctivae normal.   Cardiovascular:      Rate and Rhythm: Normal rate and regular rhythm.      Heart sounds: Normal heart sounds. No murmur heard.  Pulmonary:      Effort: Pulmonary effort is normal. No respiratory distress.      Breath sounds: Normal breath sounds. No wheezing, rhonchi or rales.   Abdominal:      General: There is no distension.      Palpations: Abdomen is soft.      Tenderness: There is no abdominal tenderness.   Musculoskeletal:         General: No swelling, tenderness or deformity.      " Cervical back: Neck supple. No tenderness.   Skin:     Coloration: Skin is not jaundiced.      Findings: No erythema.   Neurological:      General: No focal deficit present.      Mental Status: She is alert and oriented to person, place, and time.      Motor: No weakness.   Psychiatric:         Mood and Affect: Mood normal.         Behavior: Behavior normal.         Laboratory Data:  Labs have been reviewed.      Assessment:       1. Encounter for screening mammogram for malignant neoplasm of breast    2. Acute pulmonary embolism, unspecified pulmonary embolism type, unspecified whether acute cor pulmonale present    3. Acute deep vein thrombosis (DVT) of popliteal vein of left lower extremity      Recent PE likely provoked by DKA.   Additional thrombophilia evaluation will be pursued mostly to exclude APS.      Plan:     If no underlying thrombophilia identified, she may be able to get by with a minimum of 3-6 months of anticoagulation. RTC 3 months. Continue eliquis 5 bid.     Orders Placed This Encounter    Mammo Digital Screening Bilat w/ Steven    Pharmacogenomics Panel    LUPUS ANTICOAGULANT (DRVVT)    Beta 2 Glycoprotein I Antibody, IgA    Phosphatidylserine Ab (IgA,IgG,IgM)    ANTICARDIOLIPIN    AT-III    PROTEIN S ANTIGEN, FREE & TOTAL    PROTEIN C ANTIGEN, TOTAL    D dimer, quantitative          Sanchez Parekh MD, FACP  Hematology/Oncology  [facility] - [address], [address]  [place] [address]  Phone: [phone]  Fax: [phone]

## 2024-09-25 ENCOUNTER — PATIENT MESSAGE (OUTPATIENT)
Dept: CARDIOLOGY | Facility: CLINIC | Age: 59
End: 2024-09-25
Payer: COMMERCIAL

## 2024-09-26 LAB
B2 GLYCOPROT1 IGA SER QL: 2.4 U/ML
CARDIOLIPIN IGG SER IA-ACNC: <9.4 GPL (ref 0–14.99)
CARDIOLIPIN IGM SER IA-ACNC: <9.4 MPL (ref 0–12.49)
PROT C AG ACT/NOR PPP IA: >180 % (ref 72–160)

## 2024-09-27 LAB
CONFIRM DRVVT STA-STACLOT: ABNORMAL S
DRVVT SCREEN TO CONFIRM RATIO: ABNORMAL {RATIO}
HEPARIN NT PPP QL: ABNORMAL
LA 3 SCREEN W REFLEX-IMP: ABNORMAL
LMW HEPARIN IND PLT AB SER QL: ABNORMAL
MIXING DRVVT/NORMAL: ABNORMAL %
NEUTRALIZED DRVVT SCREEN RATIO: ABNORMAL
PROT S FREE AG ACT/NOR PPP IA: 109 % (ref 50–147)
PROTHROMBIN TIME: 11.8 S (ref 12–15.5)
SCREEN APTT/NORMAL: 0.97
SCREEN APTT/NORMAL: ABNORMAL
SCREEN DRVVT/NORMAL: 1.11 %
THROMBIN TIME: ABNORMAL S

## 2024-10-17 ENCOUNTER — CLINICAL SUPPORT (OUTPATIENT)
Dept: DIABETES | Facility: CLINIC | Age: 59
End: 2024-10-17
Payer: COMMERCIAL

## 2024-10-17 VITALS — HEIGHT: 60 IN | BODY MASS INDEX: 40.75 KG/M2 | WEIGHT: 207.56 LBS

## 2024-10-17 DIAGNOSIS — E11.10 DIABETIC KETOACIDOSIS WITHOUT COMA ASSOCIATED WITH TYPE 2 DIABETES MELLITUS: ICD-10-CM

## 2024-10-17 PROCEDURE — 99999 PR PBB SHADOW E&M-EST. PATIENT-LVL II: CPT | Mod: PBBFAC,,, | Performed by: DIETITIAN, REGISTERED

## 2024-10-17 PROCEDURE — G0108 DIAB MANAGE TRN  PER INDIV: HCPCS | Mod: S$GLB,,, | Performed by: DIETITIAN, REGISTERED

## 2024-10-17 NOTE — PROGRESS NOTES
Diabetes Care Specialist Progress Note  Author: Cierra Ruiz RD  Date: 10/17/2024    Intake    Program Intake  Reason for Diabetes Program Visit:: Initial Diabetes Assessment  Current diabetes risk level:: high  In the last 12 months, have you:: been admitted to a hospital  Was the ER or hospital admission related to diabetes?: Yes  Permission to speak with others about care:: no    Current Diabetes Treatment: Diet/Exercise, Insulin  Diet/Exercise Type/Dose: pt currently trying keto diet; no exercise  Method of insulin delivery?: Injections  Injection Type: Pens  Pen Type/Dose: Lantus 22u QHS; Novolog 7u ac    Continuous Glucose Monitoring  Patient has CGM: No    Lab Results   Component Value Date    HGBA1C >14.0 (H) 08/27/2024       Weight: 94.2 kg (207 lb 9 oz)   Height: 5' (152.4 cm)   Body mass index is 40.54 kg/m².    Lifestyle Coping Support & Clinical    Lifestyle/Coping/Support  Does anyone in your family have diabetes or does anyone in your family support you in your diabetes care?: positive family hx  Learning Barriers:: None  Culture or Sabianist beliefs that may impact ability to access healthcare: No  Psychosocial/Coping Skills Assessment Completed: : No  Deffered due to:: Time    Problem Review  Active Comorbidities: Obesity, Cardiovascular Disease    Diabetes Self-Management Skills Assessment    Medication Skills Assessment  Patient is able to identify current diabetes medications, dosages, and appropriate timing of medications.: yes  Patient reports problems or concerns with current medication regimen.: no  Patient is  aware that some diabetes medications can cause low blood sugar?: Yes  Medication Skills Assessment Completed:: No  Area of need?: No    Diabetes Disease Process/Treatment Options  Diabetes Type?: Type II  When were you diagnosed?: a few weeks ago  If previous diabetes education, when/where:: none  What are your goals for this education session?: really need to know what to eat  Is  patient aware of what causes diabetes?: No  Does patient understand the pathophysiology of diabetes?: No  Diabetes Disease Process/Treatment Options: Skills Assessment Completed: Yes  Assessment indicates:: Instruction Needed  Area of need?: Yes    Nutrition/Healthy Eating  Meal Plan 24 Hour Recall - Breakfast: usually skips; bread, egg, cheese, coffee with half n half  Meal Plan 24 Hour Recall - Lunch: Hamburger cal, grilled chicken, salad  Meal Plan 24 Hour Recall - Dinner: same as lunch  Meal Plan 24 Hour Recall - Beverage: water  Who shops/cooks?: pt does  Patient can identify foods that impact blood sugar.: yes  Challenges to healthy eating:: portion control  Nutrition/Healthy Eating Skills Assessment Completed:: Yes  Assessment indicates:: Instruction Needed  Area of need?: Yes    Physical Activity/Exercise  Patient's daily activity level:: sedentary (no reason)  Patient formally exercises outside of work.: no  Reasons for not exercising:: other (see comments)  Patient can identify forms of physical activity.: yes  Physical Activity/Exercise Skills Assessment Completed: : Yes  Assessment indicates:: Instruction Needed  Area of need?: Yes    Home Blood Glucose Monitoring  Patient states that blood sugar is checked at home daily.: yes  Monitoring Method:: home glucometer  Fasting BG range history:: 100-160  Postmeal BG range history:: 100-160  What is your A1c Target?: 7 or less  Home Blood Glucose Monitoring Skills Assessment Completed: : Yes  Assessment indicates:: Instruction Needed  Area of need?: Yes                Assessment Summary and Plan    Based on today's diabetes care assessment, the following areas of need were identified:          10/17/2024   Areas of Need   Medications/Current Diabetes Treatment No   Diabetes Disease Process/Treatment Options Yes,  taught pathos of DM   Nutrition/Healthy Eating Yes, see care plan below   Physical Activity/Exercise Yes, pt moves throughout the day but is  incidental to work related activities. Reviewed effects of exercise on BG; taught duration, frequency and intensity recs for exercise   Home Blood Glucose Monitoring Yes, reviewed A1c and BG target range. Pt is interested in using CGM. Will send note to MD.            Today's interventions were provided through individual discussion, instruction, and written materials were provided.      Patient verbalized understanding of instruction and written materials.  Pt was able to return back demonstration of instructions today. Patient understood key points, needs reinforcement and further instruction.     Diabetes Self-Management Care Plan:    Today's Diabetes Self-Management Care Plan was developed with Davida's input. Davida has agreed to work toward the following goal(s) to improve his/her overall diabetes control.      Care Plan: Diabetes Management   Updates made since 9/17/2024 12:00 AM        Problem: Healthy Eating         Goal: Eat 3 meals daily with 30g/2 servings of Carbohydrate per meal.    Start Date: 10/17/2024   Expected End Date: 10/31/2025   Priority: High   Barriers: No Barriers Identified   Note:    10/17/24: Pt eats 3 meals/day. Has been trying a keto diet but not fully integrated yet. She was happy to know that she can eat some carbs. She does not drink sugary beverages; mcdonald snot eat fast food often; most meals are from home. She likes veggies and fruits. She needed to understand the role of carbs and portion sizes.       Task: Reviewed the sources and role of Carbohydrate, Protein, and Fat and how each nutrient impacts blood sugar. Completed 10/17/2024        Task: Provided visual examples using dry measuring cups, food models, and other familiar objects such as computer mouse, deck or cards, tennis ball etc. to help with visualization of portions. Completed 10/17/2024        Task: Explained how to count carbohydrates using the food label and the use of dry measuring cups for accurate carb counting.  Completed 10/17/2024        Task: Discussed strategies for choosing healthier menu options when dining out. Completed 10/17/2024     Task: Review the importance of balancing carbohydrates with each meal using portion control techniques to count servings of carbohydrate and label reading to identify serving size and amount of total carbs per serving. Completed 10/17/2024         Follow Up Plan     Follow up in about 1 month (around 11/17/2024). Possible CGM start. Review carb counting (sample plate), weight, lab work, assess physical activity level, hypoglycemia.    Today's care plan and follow up schedule was discussed with patient.  Davida verbalized understanding of the care plan, goals, and agrees to follow up plan.        The patient was encouraged to communicate with his/her health care provider/physician and care team regarding his/her condition(s) and treatment.  I provided the patient with my contact information today and encouraged to contact me via phone or [facility] Patient Portal as needed.     Length of Visit   Total Time: 60 Minutes

## 2024-12-01 NOTE — PROGRESS NOTES
[facility]     SUBJECTIVE:     History of Present Illness:  Patient is a 59 y.o. female presents with PE  and treated with eliquis.     Primary Diagnosis:   Hypertension: none  DM: positive  Smoker: none   Family history of early CAD  Heart disease : none  Submassive PE with popliteal vein thrombus (unprovoked)   ROS  1.no chest pains  2. No PND or orthopnea  3. No shortness of breath  4. No syncope  5. 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    No past surgical history on file.        Past Hospitalization:         Cardiac meds:  Eliquis 5 mg BID        OBJECTIVE:     Vital Signs (Most Recent)  Vitals:    24 1153   BP: (!) 115/49   Pulse: 78   SpO2: 97%   Weight: 95.5 kg (210 lb 8.6 oz)   Height: 5' (1.524 m)         Physical Exam:  Neck: normal carotids, no bruits; normal JVP  Lungs :clear  Heart: RR, normal S1,S2, no murmurs, no gallops  Abd: no masses; no bruits;   Exts: normal DP and PT pulses bilaterally, normal radials; no edema noted               Labs  : CMP: LFT's OK; GFR normal;     Diagnostic Results:    1.EK/24: sinus, LAD, Right bundle delay   2.Echo: : normal EF; RVE, normal RV function; ANKIT, mild TR, PAS 29  3. Stress test  4. cath  5. US venous: nonocclusive thrombus left popliteal vein ; upper exts normal   Chart review:      ASSESSMENT/PLAN:     S/p PE: most likely from sitting in chair all day  Diabetic: poorly controlled   No lipids available    Plan: 1. Needs close followup for DM with primary and lipid evaluation  2. Continue eliquis for total of 6 months and then will reevaluate  3. US venous lower exts and echo at that time prior to visit  4. Return in 3 months     Nieves Block MD

## 2024-12-04 ENCOUNTER — OFFICE VISIT (OUTPATIENT)
Dept: CARDIOLOGY | Facility: CLINIC | Age: 59
End: 2024-12-04
Payer: COMMERCIAL

## 2024-12-04 VITALS
WEIGHT: 210.56 LBS | SYSTOLIC BLOOD PRESSURE: 115 MMHG | HEART RATE: 78 BPM | OXYGEN SATURATION: 97 % | HEIGHT: 60 IN | BODY MASS INDEX: 41.34 KG/M2 | DIASTOLIC BLOOD PRESSURE: 49 MMHG

## 2024-12-04 DIAGNOSIS — I26.99 ACUTE PULMONARY EMBOLISM, UNSPECIFIED PULMONARY EMBOLISM TYPE, UNSPECIFIED WHETHER ACUTE COR PULMONALE PRESENT: Primary | ICD-10-CM

## 2024-12-04 DIAGNOSIS — E11.10 DIABETIC KETOACIDOSIS WITHOUT COMA ASSOCIATED WITH TYPE 2 DIABETES MELLITUS: ICD-10-CM

## 2024-12-04 PROCEDURE — 99214 OFFICE O/P EST MOD 30 MIN: CPT | Mod: S$GLB,,, | Performed by: INTERNAL MEDICINE

## 2024-12-04 PROCEDURE — 3046F HEMOGLOBIN A1C LEVEL >9.0%: CPT | Mod: CPTII,S$GLB,, | Performed by: INTERNAL MEDICINE

## 2024-12-04 PROCEDURE — 3078F DIAST BP <80 MM HG: CPT | Mod: CPTII,S$GLB,, | Performed by: INTERNAL MEDICINE

## 2024-12-04 PROCEDURE — 1160F RVW MEDS BY RX/DR IN RCRD: CPT | Mod: CPTII,S$GLB,, | Performed by: INTERNAL MEDICINE

## 2024-12-04 PROCEDURE — 3074F SYST BP LT 130 MM HG: CPT | Mod: CPTII,S$GLB,, | Performed by: INTERNAL MEDICINE

## 2024-12-04 PROCEDURE — 1159F MED LIST DOCD IN RCRD: CPT | Mod: CPTII,S$GLB,, | Performed by: INTERNAL MEDICINE

## 2024-12-04 PROCEDURE — 3008F BODY MASS INDEX DOCD: CPT | Mod: CPTII,S$GLB,, | Performed by: INTERNAL MEDICINE

## 2024-12-04 PROCEDURE — 99999 PR PBB SHADOW E&M-EST. PATIENT-LVL III: CPT | Mod: PBBFAC,,, | Performed by: INTERNAL MEDICINE

## 2024-12-04 NOTE — PROGRESS NOTES
Diabetes Care Specialist Progress Note  Author: Cierra Ruiz RD  Date: 12/5/2024    Intake    Program Intake  Reason for Diabetes Program Visit:: Intervention  Type of Intervention:: Individual  Individual: Education  Education: Self-Management Skill Review, Nutrition and Meal Planning  Current diabetes risk level:: high  In the last month, have you used the ER or been admitted to the hospital: No  Permission to speak with others about care:: no    Current Diabetes Treatment: Diet/Exercise, Insulin  Diet/Exercise Type/Dose: pt currently trying keto diet; no exercise  Method of insulin delivery?: Injections  Injection Type: Pens  Pen Type/Dose: Lantus 22u QHS; Novolog 7u ac    Continuous Glucose Monitoring  Patient has CGM: No    Lab Results   Component Value Date    HGBA1C >14.0 (H) 08/27/2024       Weight: 95.5 kg (210 lb 6.9 oz)   Height: 5' (152.4 cm)   Body mass index is 41.1 kg/m².    Lifestyle Coping Support & Clinical    Lifestyle/Coping/Support  Does anyone in your family have diabetes or does anyone in your family support you in your diabetes care?: positive family hx  List anything about Diabetes that causes you stress?: trying to make healthy food choices  How do you deal with stress/distress?: eat  Learning Barriers:: None  Culture or Yazdanism beliefs that may impact ability to access healthcare: No  Psychosocial/Coping Skills Assessment Completed: : Yes  Assessment indicates:: Adequate understanding  Area of need?: No    Problem Review  Active Comorbidities: Obesity, Cardiovascular Disease    Diabetes Self-Management Skills Assessment    Medication Skills Assessment  Patient is able to identify current diabetes medications, dosages, and appropriate timing of medications.: yes  Patient reports problems or concerns with current medication regimen.: no  Patient is  aware that some diabetes medications can cause low blood sugar?: Yes  Medication Skills Assessment Completed:: Yes  Assessment indicates::  Adequate understanding  Area of need?: No    Diabetes Disease Process/Treatment Options  Diabetes Type?: Type II  Assessment indicates:: Instruction Needed  Area of need?: Yes    Nutrition/Healthy Eating  Meal Plan 24 Hour Recall - Breakfast: usually skips; bread, egg, cheese, coffee with half n half  Meal Plan 24 Hour Recall - Lunch: Hamburger cal, grilled chicken, salad  Meal Plan 24 Hour Recall - Dinner: same as lunch  Patient can identify foods that impact blood sugar.: yes  Challenges to healthy eating:: portion control  Assessment indicates:: Instruction Needed  Area of need?: Yes    Physical Activity/Exercise  Patient's daily activity level:: sedentary (no reason)  Patient formally exercises outside of work.: no  Reasons for not exercising:: other (see comments)  Patient can identify forms of physical activity.: yes  Assessment indicates:: Instruction Needed  Area of need?: Yes    Home Blood Glucose Monitoring  Patient states that blood sugar is checked at home daily.: yes  Monitoring Method:: home glucometer  What is your A1c Target?: 7 or less  Assessment indicates:: Instruction Needed  Area of need?: Yes    Acute Complications  Have you ever had hypoglycemia (low BG 70 or less)?: no  Have you ever had hyperglycemia (high  or more)?: yes  Acute Complications Skills Assessment Completed: : No  Deffered due to:: Time  Area of need?: Deferred    Chronic Complications  Reviewed health maintenance: yes  Have you completed your annual diabetes maintenance labwork? : yes  Do you examine your feet daily?: no  Has your doctor examined your feet?: yes  Do you see a Dentist?: yes  Dentist date of last visit:: in the last six months  Do you see an eye doctor?: yes  Eye doctor date of last visit:: overdue for an appt  Chronic Complications Skills Assessment Completed: : Yes  Assessment indicates:: Adequate understanding  Area of need?: No      Assessment Summary and Plan    Based on today's diabetes care  assessment, the following areas of need were identified:      Identified Areas of Need      Medication/Current Diabetes Treatment: No   Lifestyle Coping/Support: No   Diabetes Disease Process/Treatment Options: Yes, reviewed previously   Nutrition/Healthy Eating: Yes, see care plan below    Physical Activity/Exercise: Yes, reviewed previously    Home Blood Glucose Monitoring: Yes, reviewed previously    Acute Complications: Deferred    Chronic Complications: No       Today's interventions were provided through individual discussion, instruction, and written materials were provided.      Patient verbalized understanding of instruction and written materials.  Pt was able to return back demonstration of instructions today. Patient understood key points, needs reinforcement and further instruction.     Diabetes Self-Management Care Plan:    Today's Diabetes Self-Management Care Plan was developed with Davida's input. Davida has agreed to work toward the following goal(s) to improve his/her overall diabetes control.      Care Plan: Diabetes Management   Updates made since 12/6/2023 12:00 AM        Problem: Healthy Eating         Goal: Eat 3 meals daily with 30g/2 servings of Carbohydrate per meal.    Start Date: 10/17/2024   Expected End Date: 10/31/2025   Priority: High   Barriers: No Barriers Identified   Note:    12/5/24: Pt is eating a little bit of carbs with some of her meals. Reviewed the sample plate method, the importance of eating small amounts of carbs at meals. She was disappointed that she has not lost weight but said her clothes are a bit loose on her. Discussed how weight loss happens and encouraged her to  the pace a bit when she walks and to use light weights at home. Discussed the effects of lifting light weights and how it helps to build muscle. Reviewed [place] and [employer] events coming up and how to navigate menus.    10/17/24: Pt eats 3 meals/day. Has been trying a keto diet but not  fully integrated yet. She was happy to know that she can eat some carbs. She does not drink sugary beverages; does not eat fast food often; most meals are from home. She likes veggies and fruits. She needed to understand the role of carbs and portion sizes.       Task: Reviewed the sources and role of Carbohydrate, Protein, and Fat and how each nutrient impacts blood sugar. Completed 10/17/2024        Task: Provided visual examples using dry measuring cups, food models, and other familiar objects such as computer mouse, deck or cards, tennis ball etc. to help with visualization of portions. Completed 10/17/2024        Task: Explained how to count carbohydrates using the food label and the use of dry measuring cups for accurate carb counting. Completed 10/17/2024        Task: Discussed strategies for choosing healthier menu options when dining out. Completed 10/17/2024     Task: Review the importance of balancing carbohydrates with each meal using portion control techniques to count servings of carbohydrate and label reading to identify serving size and amount of total carbs per serving. Completed 10/17/2024         Follow Up Plan     Follow up in about 2 months (around 2/5/2025). Review A1c, bg logs, weight. Possible CGM start.     Today's care plan and follow up schedule was discussed with patient.  Davida verbalized understanding of the care plan, goals, and agrees to follow up plan.        The patient was encouraged to communicate with his/her health care provider/physician and care team regarding his/her condition(s) and treatment.  I provided the patient with my contact information today and encouraged to contact me via phone or [facility] Patient Portal as needed.     Length of Visit   Total Time: 60 Minutes

## 2024-12-05 ENCOUNTER — CLINICAL SUPPORT (OUTPATIENT)
Dept: DIABETES | Facility: CLINIC | Age: 59
End: 2024-12-05
Payer: COMMERCIAL

## 2024-12-05 VITALS — BODY MASS INDEX: 41.31 KG/M2 | WEIGHT: 210.44 LBS | HEIGHT: 60 IN

## 2024-12-05 DIAGNOSIS — E11.65 TYPE 2 DIABETES MELLITUS WITH HYPERGLYCEMIA, WITHOUT LONG-TERM CURRENT USE OF INSULIN: Primary | ICD-10-CM

## 2024-12-05 PROCEDURE — 99999 PR PBB SHADOW E&M-EST. PATIENT-LVL I: CPT | Mod: PBBFAC,,, | Performed by: DIETITIAN, REGISTERED

## 2024-12-08 DIAGNOSIS — I82.432 ACUTE DEEP VEIN THROMBOSIS (DVT) OF POPLITEAL VEIN OF LEFT LOWER EXTREMITY: ICD-10-CM

## 2024-12-08 DIAGNOSIS — I26.99 ACUTE PULMONARY EMBOLISM, UNSPECIFIED PULMONARY EMBOLISM TYPE, UNSPECIFIED WHETHER ACUTE COR PULMONALE PRESENT: Primary | ICD-10-CM

## 2024-12-08 DIAGNOSIS — I26.99 OTHER ACUTE PULMONARY EMBOLISM WITHOUT ACUTE COR PULMONALE: ICD-10-CM

## 2025-02-06 ENCOUNTER — PATIENT MESSAGE (OUTPATIENT)
Dept: CARDIOLOGY | Facility: HOSPITAL | Age: 60
End: 2025-02-06
Payer: COMMERCIAL

## 2025-02-17 ENCOUNTER — TELEPHONE (OUTPATIENT)
Dept: HEMATOLOGY/ONCOLOGY | Facility: CLINIC | Age: 60
End: 2025-02-17
Payer: COMMERCIAL

## 2025-03-28 LAB
EGFR: 101 ML/MIN/1.73M2
HBA1C MFR BLD: 8 % (ref 4–6)

## 2025-05-01 ENCOUNTER — TELEPHONE (OUTPATIENT)
Dept: HEMATOLOGY/ONCOLOGY | Facility: CLINIC | Age: 60
End: 2025-05-01
Payer: COMMERCIAL